# Patient Record
Sex: MALE | Race: BLACK OR AFRICAN AMERICAN | NOT HISPANIC OR LATINO | Employment: FULL TIME | URBAN - METROPOLITAN AREA
[De-identification: names, ages, dates, MRNs, and addresses within clinical notes are randomized per-mention and may not be internally consistent; named-entity substitution may affect disease eponyms.]

---

## 2023-11-30 ENCOUNTER — OFFICE VISIT (OUTPATIENT)
Dept: PRIMARY CARE | Facility: CLINIC | Age: 48
End: 2023-11-30
Payer: COMMERCIAL

## 2023-11-30 VITALS
RESPIRATION RATE: 18 BRPM | DIASTOLIC BLOOD PRESSURE: 97 MMHG | HEIGHT: 72 IN | WEIGHT: 204 LBS | BODY MASS INDEX: 27.63 KG/M2 | OXYGEN SATURATION: 98 % | SYSTOLIC BLOOD PRESSURE: 151 MMHG | HEART RATE: 91 BPM | TEMPERATURE: 97.9 F

## 2023-11-30 DIAGNOSIS — Z11.4 SCREENING FOR HIV (HUMAN IMMUNODEFICIENCY VIRUS): ICD-10-CM

## 2023-11-30 DIAGNOSIS — I10 PRIMARY HYPERTENSION: ICD-10-CM

## 2023-11-30 DIAGNOSIS — Z23 NEED FOR VACCINATION: ICD-10-CM

## 2023-11-30 DIAGNOSIS — Z00.00 HEALTHCARE MAINTENANCE: ICD-10-CM

## 2023-11-30 DIAGNOSIS — E55.9 HYPOVITAMINOSIS D: ICD-10-CM

## 2023-11-30 DIAGNOSIS — I10 HYPERTENSION, UNSPECIFIED TYPE: Primary | ICD-10-CM

## 2023-11-30 DIAGNOSIS — G89.29 CHRONIC BILATERAL LOW BACK PAIN WITHOUT SCIATICA: ICD-10-CM

## 2023-11-30 DIAGNOSIS — Z11.59 NEED FOR HEPATITIS C SCREENING TEST: ICD-10-CM

## 2023-11-30 DIAGNOSIS — D51.9 ANEMIA DUE TO VITAMIN B12 DEFICIENCY, UNSPECIFIED B12 DEFICIENCY TYPE: ICD-10-CM

## 2023-11-30 DIAGNOSIS — M54.50 CHRONIC BILATERAL LOW BACK PAIN WITHOUT SCIATICA: ICD-10-CM

## 2023-11-30 LAB
25(OH)D3 SERPL-MCNC: 37 NG/ML (ref 30–100)
ALBUMIN SERPL BCP-MCNC: 4.1 G/DL (ref 3.4–5)
ALP SERPL-CCNC: 97 U/L (ref 33–120)
ALT SERPL W P-5'-P-CCNC: 12 U/L (ref 10–52)
ANION GAP SERPL CALC-SCNC: 15 MMOL/L (ref 10–20)
AST SERPL W P-5'-P-CCNC: 19 U/L (ref 9–39)
BILIRUB SERPL-MCNC: 0.4 MG/DL (ref 0–1.2)
BUN SERPL-MCNC: 16 MG/DL (ref 6–23)
CALCIUM SERPL-MCNC: 9.7 MG/DL (ref 8.6–10.6)
CHLORIDE SERPL-SCNC: 103 MMOL/L (ref 98–107)
CHOLEST SERPL-MCNC: 161 MG/DL (ref 0–199)
CHOLESTEROL/HDL RATIO: 2.6
CO2 SERPL-SCNC: 28 MMOL/L (ref 21–32)
CREAT SERPL-MCNC: 0.97 MG/DL (ref 0.5–1.3)
GFR SERPL CREATININE-BSD FRML MDRD: >90 ML/MIN/1.73M*2
GLUCOSE SERPL-MCNC: 76 MG/DL (ref 74–99)
HCV AB SER QL: NONREACTIVE
HDLC SERPL-MCNC: 62.1 MG/DL
HIV 1+2 AB+HIV1 P24 AG SERPL QL IA: NONREACTIVE
NON-HDL CHOLESTEROL: 99 MG/DL (ref 0–149)
POTASSIUM SERPL-SCNC: 4.2 MMOL/L (ref 3.5–5.3)
PROT SERPL-MCNC: 7.6 G/DL (ref 6.4–8.2)
SODIUM SERPL-SCNC: 142 MMOL/L (ref 136–145)

## 2023-11-30 PROCEDURE — 86803 HEPATITIS C AB TEST: CPT

## 2023-11-30 PROCEDURE — 99396 PREV VISIT EST AGE 40-64: CPT | Performed by: INTERNAL MEDICINE

## 2023-11-30 PROCEDURE — 80053 COMPREHEN METABOLIC PANEL: CPT

## 2023-11-30 PROCEDURE — 3080F DIAST BP >= 90 MM HG: CPT | Performed by: INTERNAL MEDICINE

## 2023-11-30 PROCEDURE — 3077F SYST BP >= 140 MM HG: CPT | Performed by: INTERNAL MEDICINE

## 2023-11-30 PROCEDURE — 99212 OFFICE O/P EST SF 10 MIN: CPT | Performed by: INTERNAL MEDICINE

## 2023-11-30 PROCEDURE — 83718 ASSAY OF LIPOPROTEIN: CPT

## 2023-11-30 PROCEDURE — 87389 HIV-1 AG W/HIV-1&-2 AB AG IA: CPT

## 2023-11-30 PROCEDURE — 36415 COLL VENOUS BLD VENIPUNCTURE: CPT

## 2023-11-30 PROCEDURE — 82306 VITAMIN D 25 HYDROXY: CPT

## 2023-11-30 PROCEDURE — 1036F TOBACCO NON-USER: CPT | Performed by: INTERNAL MEDICINE

## 2023-11-30 PROCEDURE — 82465 ASSAY BLD/SERUM CHOLESTEROL: CPT

## 2023-11-30 PROCEDURE — 83036 HEMOGLOBIN GLYCOSYLATED A1C: CPT

## 2023-11-30 RX ORDER — AMLODIPINE BESYLATE 5 MG/1
5 TABLET ORAL DAILY
COMMUNITY
End: 2023-11-30 | Stop reason: SDUPTHER

## 2023-11-30 RX ORDER — OMEPRAZOLE 20 MG/1
20 CAPSULE, DELAYED RELEASE ORAL 2 TIMES DAILY
COMMUNITY
Start: 2019-12-30

## 2023-11-30 RX ORDER — LISINOPRIL 10 MG/1
10 TABLET ORAL DAILY
COMMUNITY
End: 2023-11-30 | Stop reason: WASHOUT

## 2023-11-30 RX ORDER — AMLODIPINE BESYLATE 5 MG/1
5 TABLET ORAL DAILY
Qty: 90 TABLET | Refills: 3 | Status: SHIPPED | OUTPATIENT
Start: 2023-11-30 | End: 2024-11-29

## 2023-11-30 RX ORDER — HYDROCHLOROTHIAZIDE 12.5 MG/1
12.5 TABLET ORAL DAILY
Qty: 90 TABLET | Refills: 3 | Status: SHIPPED | OUTPATIENT
Start: 2023-11-30 | End: 2024-11-29

## 2023-11-30 SDOH — HEALTH STABILITY: PHYSICAL HEALTH: ON AVERAGE, HOW MANY MINUTES DO YOU ENGAGE IN EXERCISE AT THIS LEVEL?: 60 MIN

## 2023-11-30 SDOH — ECONOMIC STABILITY: HOUSING INSECURITY
IN THE LAST 12 MONTHS, WAS THERE A TIME WHEN YOU DID NOT HAVE A STEADY PLACE TO SLEEP OR SLEPT IN A SHELTER (INCLUDING NOW)?: NO

## 2023-11-30 SDOH — ECONOMIC STABILITY: GENERAL
WHICH OF THE FOLLOWING DO YOU KNOW HOW TO USE AND HAVE ACCESS TO EVERY DAY? (CHOOSE ALL THAT APPLY): DESKTOP COMPUTER, LAPTOP COMPUTER, OR TABLET WITH BROADBAND INTERNET CONNECTION;SMARTPHONE WITH CELLULAR DATA PLAN

## 2023-11-30 SDOH — ECONOMIC STABILITY: FOOD INSECURITY: WITHIN THE PAST 12 MONTHS, YOU WORRIED THAT YOUR FOOD WOULD RUN OUT BEFORE YOU GOT MONEY TO BUY MORE.: NEVER TRUE

## 2023-11-30 SDOH — ECONOMIC STABILITY: INCOME INSECURITY: IN THE LAST 12 MONTHS, WAS THERE A TIME WHEN YOU WERE NOT ABLE TO PAY THE MORTGAGE OR RENT ON TIME?: NO

## 2023-11-30 SDOH — ECONOMIC STABILITY: FOOD INSECURITY: WITHIN THE PAST 12 MONTHS, THE FOOD YOU BOUGHT JUST DIDN'T LAST AND YOU DIDN'T HAVE MONEY TO GET MORE.: NEVER TRUE

## 2023-11-30 SDOH — ECONOMIC STABILITY: TRANSPORTATION INSECURITY
IN THE PAST 12 MONTHS, HAS LACK OF TRANSPORTATION KEPT YOU FROM MEETINGS, WORK, OR FROM GETTING THINGS NEEDED FOR DAILY LIVING?: NO

## 2023-11-30 SDOH — ECONOMIC STABILITY: TRANSPORTATION INSECURITY
IN THE PAST 12 MONTHS, HAS THE LACK OF TRANSPORTATION KEPT YOU FROM MEDICAL APPOINTMENTS OR FROM GETTING MEDICATIONS?: NO

## 2023-11-30 SDOH — HEALTH STABILITY: PHYSICAL HEALTH: ON AVERAGE, HOW MANY DAYS PER WEEK DO YOU ENGAGE IN MODERATE TO STRENUOUS EXERCISE (LIKE A BRISK WALK)?: 6 DAYS

## 2023-11-30 ASSESSMENT — ENCOUNTER SYMPTOMS
DIARRHEA: 0
PALPITATIONS: 0
LOSS OF SENSATION IN FEET: 0
CHEST TIGHTNESS: 0
AGITATION: 0
CHILLS: 0
OCCASIONAL FEELINGS OF UNSTEADINESS: 0
NAUSEA: 0
ACTIVITY CHANGE: 0
SHORTNESS OF BREATH: 0
MYALGIAS: 0
WEAKNESS: 0
DEPRESSION: 0
SORE THROAT: 0
BACK PAIN: 1
SINUS PRESSURE: 0

## 2023-11-30 ASSESSMENT — ANXIETY QUESTIONNAIRES
5. BEING SO RESTLESS THAT IT IS HARD TO SIT STILL: NOT AT ALL
3. WORRYING TOO MUCH ABOUT DIFFERENT THINGS: NOT AT ALL
GAD7 TOTAL SCORE: 0
2. NOT BEING ABLE TO STOP OR CONTROL WORRYING: NOT AT ALL
1. FEELING NERVOUS, ANXIOUS, OR ON EDGE: NOT AT ALL
7. FEELING AFRAID AS IF SOMETHING AWFUL MIGHT HAPPEN: NOT AT ALL
6. BECOMING EASILY ANNOYED OR IRRITABLE: NOT AT ALL
IF YOU CHECKED OFF ANY PROBLEMS ON THIS QUESTIONNAIRE, HOW DIFFICULT HAVE THESE PROBLEMS MADE IT FOR YOU TO DO YOUR WORK, TAKE CARE OF THINGS AT HOME, OR GET ALONG WITH OTHER PEOPLE: NOT DIFFICULT AT ALL
4. TROUBLE RELAXING: NOT AT ALL

## 2023-11-30 ASSESSMENT — COLUMBIA-SUICIDE SEVERITY RATING SCALE - C-SSRS
1. IN THE PAST MONTH, HAVE YOU WISHED YOU WERE DEAD OR WISHED YOU COULD GO TO SLEEP AND NOT WAKE UP?: NO
2. HAVE YOU ACTUALLY HAD ANY THOUGHTS OF KILLING YOURSELF?: NO
6. HAVE YOU EVER DONE ANYTHING, STARTED TO DO ANYTHING, OR PREPARED TO DO ANYTHING TO END YOUR LIFE?: NO

## 2023-11-30 ASSESSMENT — LIFESTYLE VARIABLES
AUDIT-C TOTAL SCORE: 2
HOW OFTEN DO YOU HAVE A DRINK CONTAINING ALCOHOL: MONTHLY OR LESS
SKIP TO QUESTIONS 9-10: 0
HOW OFTEN DO YOU HAVE SIX OR MORE DRINKS ON ONE OCCASION: LESS THAN MONTHLY
HOW MANY STANDARD DRINKS CONTAINING ALCOHOL DO YOU HAVE ON A TYPICAL DAY: 1 OR 2

## 2023-11-30 ASSESSMENT — SOCIAL DETERMINANTS OF HEALTH (SDOH)
HOW OFTEN DO YOU ATTENT MEETINGS OF THE CLUB OR ORGANIZATION YOU BELONG TO?: PATIENT DECLINED
WITHIN THE LAST YEAR, HAVE YOU BEEN AFRAID OF YOUR PARTNER OR EX-PARTNER?: NO
HOW HARD IS IT FOR YOU TO PAY FOR THE VERY BASICS LIKE FOOD, HOUSING, MEDICAL CARE, AND HEATING?: NOT VERY HARD
WITHIN THE LAST YEAR, HAVE YOU BEEN KICKED, HIT, SLAPPED, OR OTHERWISE PHYSICALLY HURT BY YOUR PARTNER OR EX-PARTNER?: NO
HOW OFTEN DO YOU GET TOGETHER WITH FRIENDS OR RELATIVES?: MORE THAN THREE TIMES A WEEK
ARE YOU MARRIED, WIDOWED, DIVORCED, SEPARATED, NEVER MARRIED, OR LIVING WITH A PARTNER?: PATIENT DECLINED
IN THE PAST 12 MONTHS, HAS THE ELECTRIC, GAS, OIL, OR WATER COMPANY THREATENED TO SHUT OFF SERVICE IN YOUR HOME?: NO
HOW OFTEN DO YOU ATTEND CHURCH OR RELIGIOUS SERVICES?: MORE THAN 4 TIMES PER YEAR
WITHIN THE LAST YEAR, HAVE TO BEEN RAPED OR FORCED TO HAVE ANY KIND OF SEXUAL ACTIVITY BY YOUR PARTNER OR EX-PARTNER?: NO
DO YOU BELONG TO ANY CLUBS OR ORGANIZATIONS SUCH AS CHURCH GROUPS UNIONS, FRATERNAL OR ATHLETIC GROUPS, OR SCHOOL GROUPS?: PATIENT DECLINED
IN A TYPICAL WEEK, HOW MANY TIMES DO YOU TALK ON THE PHONE WITH FAMILY, FRIENDS, OR NEIGHBORS?: MORE THAN THREE TIMES A WEEK
WITHIN THE LAST YEAR, HAVE YOU BEEN HUMILIATED OR EMOTIONALLY ABUSED IN OTHER WAYS BY YOUR PARTNER OR EX-PARTNER?: NO

## 2023-11-30 ASSESSMENT — PAIN SCALES - GENERAL: PAINLEVEL: 7

## 2023-11-30 ASSESSMENT — PATIENT HEALTH QUESTIONNAIRE - PHQ9
1. LITTLE INTEREST OR PLEASURE IN DOING THINGS: NOT AT ALL
2. FEELING DOWN, DEPRESSED OR HOPELESS: NOT AT ALL
SUM OF ALL RESPONSES TO PHQ9 QUESTIONS 1 & 2: 0

## 2023-11-30 NOTE — LETTER
November 30, 2023     Patient: Tha Leon Jr.   YOB: 1975   Date of Visit: 11/30/2023       To Whom It May Concern:    Tha Leon was seen in my clinic on 11/30/2023 at 1:45 pm. Please excuse Tha for his absence from work from 11/30/23 to 12/5/23.    If you have any questions or concerns, please don't hesitate to call.         Sincerely,         Billy Quesada MD        CC: No Recipients

## 2023-11-30 NOTE — PROGRESS NOTES
Subjective   Patient ID: Tha Leon Jr. is a 48 y.o. male who presents for Annual Exam and Back Pain.  He has back pain. Only taking amlodipine.  He has done well up until recently when he was picking up items that were too heavy.  The pain does not radiate into his legs but it prevents him from bending effectively.    Back Pain  Pertinent negatives include no chest pain or weakness.        Review of Systems   Constitutional:  Negative for activity change and chills.   HENT:  Negative for congestion, sinus pressure and sore throat.    Respiratory:  Negative for chest tightness and shortness of breath.    Cardiovascular:  Negative for chest pain and palpitations.   Gastrointestinal:  Negative for diarrhea and nausea.   Musculoskeletal:  Positive for back pain. Negative for myalgias.   Neurological:  Negative for weakness.   Psychiatric/Behavioral:  Negative for agitation and behavioral problems.        Objective   BP (!) 151/97 (BP Location: Left arm, Patient Position: Sitting, BP Cuff Size: Large adult)   Pulse 91   Temp 36.6 °C (97.9 °F) (Temporal)   Resp 18   Ht 1.829 m (6')   Wt 92.5 kg (204 lb)   SpO2 98%   BMI 27.67 kg/m²     Physical Exam  Constitutional:       Appearance: Normal appearance.   HENT:      Head: Normocephalic and atraumatic.      Nose: Nose normal.      Mouth/Throat:      Mouth: Mucous membranes are moist.   Eyes:      Extraocular Movements: Extraocular movements intact.      Pupils: Pupils are equal, round, and reactive to light.   Cardiovascular:      Rate and Rhythm: Normal rate and regular rhythm.   Pulmonary:      Effort: No respiratory distress.      Breath sounds: No wheezing.   Abdominal:      General: Bowel sounds are normal.      Palpations: Abdomen is soft.   Musculoskeletal:      Thoracic back: Tenderness present. Decreased range of motion.      Lumbar back: Spasms present. Decreased range of motion.   Neurological:      General: No focal deficit present.  "        Assessment/Plan   Problem List Items Addressed This Visit             ICD-10-CM    Chronic bilateral low back pain without sciatica M54.50, G89.29    Hypertension - Primary I10    Relevant Medications    hydroCHLOROthiazide (HYDRODiuril) 12.5 mg tablet    amLODIPine (Norvasc) 5 mg tablet    Other Relevant Orders    Lipid Panel Non-Fasting    Comprehensive Metabolic Panel     Other Visit Diagnoses         Codes    Screening for HIV (human immunodeficiency virus)     Z11.4    Relevant Orders    HIV 1/2 Antigen/Antibody Screen with Reflex to Confirmation    Need for vaccination     Z23    Relevant Medications    tetanus-diphtheria toxoids-Td (Tenivac 2-2) injection    Need for hepatitis C screening test     Z11.59    Relevant Orders    Hepatitis C Antibody    Healthcare maintenance     Z00.00    Relevant Orders    XR DEXA bone density    Hemoglobin A1C    Hepatitis B surface antibody    Hypovitaminosis D     E55.9    Relevant Orders    Vitamin D 25-Hydroxy,Total (for eval of Vitamin D levels)    Anemia due to vitamin B12 deficiency, unspecified B12 deficiency type     D51.9    Relevant Orders    Vitamin B12        Patient will need to be taking the muscle relaxant with over-the-counter nonsteroidals.  He is to give his back rest for the next few days.  Otherwise we will get screening labs as suggested by \"care gaps.\"       "

## 2023-12-01 LAB
EST. AVERAGE GLUCOSE BLD GHB EST-MCNC: 111 MG/DL
HBA1C MFR BLD: 5.5 %

## 2024-02-08 ENCOUNTER — OFFICE VISIT (OUTPATIENT)
Dept: GASTROENTEROLOGY | Facility: HOSPITAL | Age: 49
End: 2024-02-08
Payer: COMMERCIAL

## 2024-02-08 VITALS
WEIGHT: 195 LBS | TEMPERATURE: 97.7 F | OXYGEN SATURATION: 98 % | SYSTOLIC BLOOD PRESSURE: 116 MMHG | BODY MASS INDEX: 24.25 KG/M2 | DIASTOLIC BLOOD PRESSURE: 82 MMHG | HEART RATE: 110 BPM | HEIGHT: 75 IN

## 2024-02-08 DIAGNOSIS — K29.70 HELICOBACTER PYLORI GASTRITIS: ICD-10-CM

## 2024-02-08 DIAGNOSIS — B96.81 HELICOBACTER PYLORI GASTRITIS: ICD-10-CM

## 2024-02-08 DIAGNOSIS — K51.219 ULCERATIVE PROCTITIS WITH COMPLICATION (MULTI): Primary | ICD-10-CM

## 2024-02-08 PROCEDURE — 99215 OFFICE O/P EST HI 40 MIN: CPT | Performed by: STUDENT IN AN ORGANIZED HEALTH CARE EDUCATION/TRAINING PROGRAM

## 2024-02-08 PROCEDURE — 1036F TOBACCO NON-USER: CPT | Performed by: STUDENT IN AN ORGANIZED HEALTH CARE EDUCATION/TRAINING PROGRAM

## 2024-02-08 PROCEDURE — 3079F DIAST BP 80-89 MM HG: CPT | Performed by: STUDENT IN AN ORGANIZED HEALTH CARE EDUCATION/TRAINING PROGRAM

## 2024-02-08 PROCEDURE — 3074F SYST BP LT 130 MM HG: CPT | Performed by: STUDENT IN AN ORGANIZED HEALTH CARE EDUCATION/TRAINING PROGRAM

## 2024-02-08 RX ORDER — MESALAMINE 1000 MG/1
1000 SUPPOSITORY RECTAL NIGHTLY
Qty: 30 SUPPOSITORY | Refills: 0 | Status: SHIPPED | OUTPATIENT
Start: 2024-02-08 | End: 2024-03-09

## 2024-02-08 SDOH — ECONOMIC STABILITY: FOOD INSECURITY: WITHIN THE PAST 12 MONTHS, YOU WORRIED THAT YOUR FOOD WOULD RUN OUT BEFORE YOU GOT MONEY TO BUY MORE.: NEVER TRUE

## 2024-02-08 SDOH — ECONOMIC STABILITY: FOOD INSECURITY: WITHIN THE PAST 12 MONTHS, THE FOOD YOU BOUGHT JUST DIDN'T LAST AND YOU DIDN'T HAVE MONEY TO GET MORE.: NEVER TRUE

## 2024-02-08 ASSESSMENT — LIFESTYLE VARIABLES
HOW MANY STANDARD DRINKS CONTAINING ALCOHOL DO YOU HAVE ON A TYPICAL DAY: 3 OR 4
HOW OFTEN DO YOU HAVE A DRINK CONTAINING ALCOHOL: MONTHLY OR LESS
HOW OFTEN DO YOU HAVE SIX OR MORE DRINKS ON ONE OCCASION: NEVER
SKIP TO QUESTIONS 9-10: 0
AUDIT-C TOTAL SCORE: 2

## 2024-02-08 ASSESSMENT — COLUMBIA-SUICIDE SEVERITY RATING SCALE - C-SSRS
6. HAVE YOU EVER DONE ANYTHING, STARTED TO DO ANYTHING, OR PREPARED TO DO ANYTHING TO END YOUR LIFE?: NO
1. IN THE PAST MONTH, HAVE YOU WISHED YOU WERE DEAD OR WISHED YOU COULD GO TO SLEEP AND NOT WAKE UP?: NO
2. HAVE YOU ACTUALLY HAD ANY THOUGHTS OF KILLING YOURSELF?: NO

## 2024-02-08 ASSESSMENT — PATIENT HEALTH QUESTIONNAIRE - PHQ9
2. FEELING DOWN, DEPRESSED OR HOPELESS: NOT AT ALL
1. LITTLE INTEREST OR PLEASURE IN DOING THINGS: NOT AT ALL
SUM OF ALL RESPONSES TO PHQ9 QUESTIONS 1 & 2: 0

## 2024-02-08 ASSESSMENT — PAIN SCALES - GENERAL: PAINLEVEL: 0-NO PAIN

## 2024-02-08 ASSESSMENT — ENCOUNTER SYMPTOMS
OCCASIONAL FEELINGS OF UNSTEADINESS: 0
DEPRESSION: 0
LOSS OF SENSATION IN FEET: 0

## 2024-02-08 NOTE — PROGRESS NOTES
REASON FOR VISIT:  fu UC, HP gastritis    HPI:  LUBA GODDARD is a 47 year old male with a pmhx of HTN, PUD 2019 (gastric ulcer) attributed to NSAID use c/b GIB requiring transfusion, HP gastritis 2022, ulcerative proctitis who presents for fu.      Summary:   Admission 12/2019 (pulled from [facility]) for anemia and melena with drop in hgb from 12?7.8. Notes reports that EGD showed multiple gastric ulcers, non bleeding, biopsy taken, +HP. Unclear if treated or eradication confirmed. D/c on Prilosec 20 mg po bid for 2 months.  Initially seen for OV 10/2022 for fatigue and hematochezia. Labs on 10/11/22 with a hgb of 11.5, normocytic.   Planned for EGD/colonoscopy and repeat bloodwork in 3 months after iron supplementation.    Underwent EGD/colonoscopy 11/2022 with findings c/w HP gastritis and distal colitis. Started quad therapy with confirmatory breath testing in 2-3 months. Also with chronic colitis in rectum, started on topical mesalamine therapy.    OV 1/2023: Largely resolved UGI sx after initiation of HP therapy. Confirmatory breath testing in 2 months. Colonoscopy notable for ulcerative proctitis, likely etiology of prior hematochezia, intermittent compliance with topical therapy. Prefers suppositories to enemas, encouraged daily compliance. Plan for flex sig for disease assessment in 6-12 months. Bloodwork ordered (FCP, CRP, iron studies, cbc, cmp).     In the interim, breath testing, bloodwork, and flex sig not done.     Today, he reports he only took 3 days of topical therapy. He then stopped and remained asymptomatic for nearly a year. Gradual recurrence of sx after the new year. Had three weeks of bloody loose stools in January which resolved. Currently, BMs occur 2-3x/day, paste-y, occ blood 1-2x/week, light. No abdominal pain. +bloating. No n/v. + weight loss of 10 lbs which he attributes to not eating as well. No eye pain. +joint pain.     HCV NR 11/2023. CMP unremarkable 11/2023.     No fhx of CRC,  pancreatic, gastric or esophageal cancer. No tobacco use, occ alcohol use, and occ marijuana use.      Prev endoscopic eval:   EGD 11/30/2022: - Normal esophagus. - Z-line regular, 40 cm from the incisors. - Erythematous mucosa in the gastric body. Biopsied. - Erythematous congested duodenopathy. - The examination was otherwise normal.      Colonoscopy 11/30/2022: - Preparation of the colon was fair. - Aphtha in the terminal ileum. Biopsied. - Localized moderate inflammation was found in the mid rectum and in the distal rectum, rule out ulcerative colitis. Biopsied. - The examination was otherwise normal on direct and retroflexion views. - Biopsies for surveillance were taken from the cecum, ascending colon, transverse colon, descending colon and sigmoid colon.      Pathology:   A. GASTRIC ANTRUM AND BODY, BIOPSIES: --ACTIVE HELICOBACTER GASTRITIS. Note: Immunohistochemical staining highlights numerous microorganisms morphologically consistent with Helicobacter. The H. pylori drug susceptibility assay has been ordered and the results will appear separately in an addendum report.   B. TERMINAL ILEUM, BIOPSY: --FOCAL ACTIVE ILEITIS, NO GRANULOMAS IDENTIFIED.   C. CECUM AND ASCENDING COLON, BIOPSIES: --COLONIC MUCOSA, NO SIGNIFICANT HISTOPATHOLOGICAL ABNORMALITIES.   D. TRANSVERSE COLON, COLD BIOPSY: --COLONIC MUCOSA, NO SIGNIFICANT HISTOPATHOLOGICAL ABNORMALITIES.   E. DESCENDING AND SIGMOID COLON, BIOPSIES: --COLONIC MUCOSA, NO SIGNIFICANT HISTOPATHOLOGICAL ABNORMALITIES.   F. RECTUM, COLD BIOPSY: --DIFFUSE ACTIVE CHRONIC COLITIS, NO GRANULOMAS IDENTIFIED. Note: Histopathological features suggest ulcerative proctitis. However, infection associated proctitis and/or medication effects may cause similar histopathological alterations.      EGD 12/2019 for anemia/melena: report not found, prog note from hospitalization notes 3 antral ulcers  Pathology 12/2019:   Stomach, antrum, biopsy - Chronic active gastritis with H.  "pylori-like organisms; see comment.  COMMENT: H. pylori-like organisms are identified on routine H&E stained sections.     Colonoscopy: ?possible (wife denies, pt recalls taking a bowel prep in the remote past)     REVIEW OF SYSTEMS  CONSTITUTIONAL: Negative for fevers  HEENT: Negative for frequent/significant headaches  RESPIRATORY: Negative for hemoptysis  CARDIOVASCULAR: Negative for chest pain  GI: See HPI  : Negative for hematuria  MUSCULOSKELETAL: Negative for arthralgia or myalgia  INTEGUMENTARY/SKIN: Negative for rash or skin lesion  HEMATOLOGY/LYMPHOLOGY: Negative for prolonged bleeding  ENDOCRINE: Negative for cold/heat intolerance  NEURO: Negative for encephalopathy  PSYCH: Negative for new changes in mood or affect      No Known Allergies    Past Medical History:   Diagnosis Date    Personal history of other diseases of the circulatory system     History of hypertension    Personal history of other diseases of the musculoskeletal system and connective tissue     History of low back pain       No past surgical history on file.    No family history on file.    Social History     Tobacco Use    Smoking status: Never    Smokeless tobacco: Never   Substance Use Topics    Alcohol use: Yes     Alcohol/week: 4.0 standard drinks of alcohol     Types: 4 Shots of liquor per week       Current Outpatient Medications   Medication Sig Dispense Refill    amLODIPine (Norvasc) 5 mg tablet Take 1 tablet (5 mg) by mouth once daily. for blood pressure 90 tablet 3    hydroCHLOROthiazide (HYDRODiuril) 12.5 mg tablet Take 1 tablet (12.5 mg) by mouth once daily. 90 tablet 3    omeprazole (PriLOSEC) 20 mg DR capsule Take 1 capsule (20 mg) by mouth twice a day.       No current facility-administered medications for this visit.       PHYSICAL EXAM:  /82   Pulse 110   Temp 36.5 °C (97.7 °F)   Ht 1.905 m (6' 3\")   Wt 88.5 kg (195 lb)   SpO2 98%   BMI 24.37 kg/m²    Gen: aaox3, NAD  Head: Normocephalic, " atraumatic  Eyes: Sclera anicteric, conjunctiva pink   Neck: Supple  Heart: RRR, no murmurs, rubs or gallops  Lungs: CTAB, non-labored breathing  Abd: Soft, non-distended, non-tender, bowel sounds present, no rebound or guarding, no organomegaly  Ext: No LE edema b/l  Neuro: no gross focal deficits  Psych: Congruent mood and affect, appropriate insight and judgement  Skin: No jaundice    Lab Results   Component Value Date    ALT 12 11/30/2023    AST 19 11/30/2023    ALKPHOS 97 11/30/2023    BILITOT 0.4 11/30/2023       ASSESSMENT  HP gastritis, s/p therapy, eradication not confirmed  Ulcerative proctitis, not on therapy  Anemia, normocytic      Lost to follow up x 1 year. Remained asx for nearly that whole time, now with recurrence of sx. Will restart therapy. Plan for flex sig for disease assessment in 6-12 months. Bloodwork ordered. Confirmatory breath testing to confirm HP eradication also ordered.      PLAN       --HP breath test  --restart canasa supp qhs  --CRP, FCP for baseline   --flex sig in 6 months for disease assessment  --iron studies, CBC  --colon 2027 (5 year recall due to fair bowel prep) for screening     FU 4 months       Signature: Cheryl Abdullahi MD    Date: 2/8/2024  Time: 9:39 AM

## 2024-02-08 NOTE — PATIENT INSTRUCTIONS
Thank you for seeing us in clinic today!     In summary, please do the following:  We will schedule you for your flexible sigmoidoscopy in about  6 months.   2.   Please get your bloodwork, breath test, and stool studies at your earliest convenience.   3. Please start your Canasa suppositories every night for at least 3 weeks.     We will see you again in 4 months or sooner if needed.   If you have any questions or concerns, please call the office at [phone].

## 2024-02-19 ENCOUNTER — APPOINTMENT (OUTPATIENT)
Dept: GASTROENTEROLOGY | Facility: CLINIC | Age: 49
End: 2024-02-19
Payer: COMMERCIAL

## 2024-02-20 ENCOUNTER — APPOINTMENT (OUTPATIENT)
Dept: RADIOLOGY | Facility: CLINIC | Age: 49
End: 2024-02-20
Payer: COMMERCIAL

## 2024-03-20 ENCOUNTER — APPOINTMENT (OUTPATIENT)
Dept: GASTROENTEROLOGY | Facility: HOSPITAL | Age: 49
End: 2024-03-20
Payer: COMMERCIAL

## 2024-03-27 ENCOUNTER — HOSPITAL ENCOUNTER (EMERGENCY)
Facility: HOSPITAL | Age: 49
Discharge: HOME | End: 2024-03-28
Attending: EMERGENCY MEDICINE
Payer: COMMERCIAL

## 2024-03-27 DIAGNOSIS — R59.0 INTRA-ABDOMINAL LYMPHADENOPATHY: Primary | ICD-10-CM

## 2024-03-27 PROCEDURE — 99285 EMERGENCY DEPT VISIT HI MDM: CPT

## 2024-03-27 ASSESSMENT — COLUMBIA-SUICIDE SEVERITY RATING SCALE - C-SSRS
2. HAVE YOU ACTUALLY HAD ANY THOUGHTS OF KILLING YOURSELF?: NO
6. HAVE YOU EVER DONE ANYTHING, STARTED TO DO ANYTHING, OR PREPARED TO DO ANYTHING TO END YOUR LIFE?: NO
1. IN THE PAST MONTH, HAVE YOU WISHED YOU WERE DEAD OR WISHED YOU COULD GO TO SLEEP AND NOT WAKE UP?: NO

## 2024-03-27 ASSESSMENT — PAIN SCALES - GENERAL: PAINLEVEL_OUTOF10: 7

## 2024-03-27 ASSESSMENT — PAIN - FUNCTIONAL ASSESSMENT: PAIN_FUNCTIONAL_ASSESSMENT: 0-10

## 2024-03-27 ASSESSMENT — PAIN DESCRIPTION - PAIN TYPE: TYPE: ACUTE PAIN

## 2024-03-28 ENCOUNTER — APPOINTMENT (OUTPATIENT)
Dept: CARDIOLOGY | Facility: HOSPITAL | Age: 49
End: 2024-03-28
Payer: COMMERCIAL

## 2024-03-28 ENCOUNTER — APPOINTMENT (OUTPATIENT)
Dept: RADIOLOGY | Facility: HOSPITAL | Age: 49
End: 2024-03-28
Payer: COMMERCIAL

## 2024-03-28 VITALS
DIASTOLIC BLOOD PRESSURE: 89 MMHG | OXYGEN SATURATION: 99 % | BODY MASS INDEX: 24.87 KG/M2 | WEIGHT: 200 LBS | TEMPERATURE: 99.1 F | HEIGHT: 75 IN | HEART RATE: 93 BPM | SYSTOLIC BLOOD PRESSURE: 130 MMHG | RESPIRATION RATE: 18 BRPM

## 2024-03-28 LAB
ALBUMIN SERPL BCP-MCNC: 3.7 G/DL (ref 3.4–5)
ALP SERPL-CCNC: 86 U/L (ref 33–120)
ALT SERPL W P-5'-P-CCNC: 7 U/L (ref 10–52)
ANION GAP SERPL CALC-SCNC: 14 MMOL/L (ref 10–20)
APPEARANCE UR: CLEAR
AST SERPL W P-5'-P-CCNC: 15 U/L (ref 9–39)
BASOPHILS # BLD AUTO: 0.04 X10*3/UL (ref 0–0.1)
BASOPHILS NFR BLD AUTO: 0.3 %
BILIRUB SERPL-MCNC: 0.5 MG/DL (ref 0–1.2)
BILIRUB UR STRIP.AUTO-MCNC: NEGATIVE MG/DL
BUN SERPL-MCNC: 14 MG/DL (ref 6–23)
CALCIUM SERPL-MCNC: 9.8 MG/DL (ref 8.6–10.3)
CHLORIDE SERPL-SCNC: 94 MMOL/L (ref 98–107)
CO2 SERPL-SCNC: 30 MMOL/L (ref 21–32)
COLOR UR: ABNORMAL
CREAT SERPL-MCNC: 0.86 MG/DL (ref 0.5–1.3)
EGFRCR SERPLBLD CKD-EPI 2021: >90 ML/MIN/1.73M*2
EOSINOPHIL # BLD AUTO: 0.48 X10*3/UL (ref 0–0.7)
EOSINOPHIL NFR BLD AUTO: 3.4 %
ERYTHROCYTE [DISTWIDTH] IN BLOOD BY AUTOMATED COUNT: 13.4 % (ref 11.5–14.5)
FLUAV RNA RESP QL NAA+PROBE: NOT DETECTED
FLUBV RNA RESP QL NAA+PROBE: NOT DETECTED
GLUCOSE SERPL-MCNC: 98 MG/DL (ref 74–99)
GLUCOSE UR STRIP.AUTO-MCNC: NORMAL MG/DL
HCT VFR BLD AUTO: 32.5 % (ref 41–52)
HGB BLD-MCNC: 11.1 G/DL (ref 13.5–17.5)
HOLD SPECIMEN: NORMAL
IMM GRANULOCYTES # BLD AUTO: 0.05 X10*3/UL (ref 0–0.7)
IMM GRANULOCYTES NFR BLD AUTO: 0.4 % (ref 0–0.9)
KETONES UR STRIP.AUTO-MCNC: ABNORMAL MG/DL
LEUKOCYTE ESTERASE UR QL STRIP.AUTO: NEGATIVE
LIPASE SERPL-CCNC: 8 U/L (ref 9–82)
LYMPHOCYTES # BLD AUTO: 2.59 X10*3/UL (ref 1.2–4.8)
LYMPHOCYTES NFR BLD AUTO: 18.3 %
MCH RBC QN AUTO: 29.1 PG (ref 26–34)
MCHC RBC AUTO-ENTMCNC: 34.2 G/DL (ref 32–36)
MCV RBC AUTO: 85 FL (ref 80–100)
MONOCYTES # BLD AUTO: 1.16 X10*3/UL (ref 0.1–1)
MONOCYTES NFR BLD AUTO: 8.2 %
NEUTROPHILS # BLD AUTO: 9.8 X10*3/UL (ref 1.2–7.7)
NEUTROPHILS NFR BLD AUTO: 69.4 %
NITRITE UR QL STRIP.AUTO: NEGATIVE
NRBC BLD-RTO: 0 /100 WBCS (ref 0–0)
PH UR STRIP.AUTO: 5.5 [PH]
PLATELET # BLD AUTO: 400 X10*3/UL (ref 150–450)
POTASSIUM SERPL-SCNC: 3.9 MMOL/L (ref 3.5–5.3)
PROT SERPL-MCNC: 8.6 G/DL (ref 6.4–8.2)
PROT UR STRIP.AUTO-MCNC: NEGATIVE MG/DL
RBC # BLD AUTO: 3.81 X10*6/UL (ref 4.5–5.9)
RBC # UR STRIP.AUTO: NEGATIVE /UL
SARS-COV-2 RNA RESP QL NAA+PROBE: NOT DETECTED
SODIUM SERPL-SCNC: 134 MMOL/L (ref 136–145)
SP GR UR STRIP.AUTO: 1.04
UROBILINOGEN UR STRIP.AUTO-MCNC: NORMAL MG/DL
WBC # BLD AUTO: 14.1 X10*3/UL (ref 4.4–11.3)

## 2024-03-28 PROCEDURE — 83690 ASSAY OF LIPASE: CPT | Performed by: EMERGENCY MEDICINE

## 2024-03-28 PROCEDURE — 2500000005 HC RX 250 GENERAL PHARMACY W/O HCPCS: Performed by: EMERGENCY MEDICINE

## 2024-03-28 PROCEDURE — 85025 COMPLETE CBC W/AUTO DIFF WBC: CPT | Performed by: EMERGENCY MEDICINE

## 2024-03-28 PROCEDURE — 80053 COMPREHEN METABOLIC PANEL: CPT | Performed by: EMERGENCY MEDICINE

## 2024-03-28 PROCEDURE — 93005 ELECTROCARDIOGRAM TRACING: CPT

## 2024-03-28 PROCEDURE — 74177 CT ABD & PELVIS W/CONTRAST: CPT | Performed by: RADIOLOGY

## 2024-03-28 PROCEDURE — 81003 URINALYSIS AUTO W/O SCOPE: CPT | Performed by: EMERGENCY MEDICINE

## 2024-03-28 PROCEDURE — 87636 SARSCOV2 & INF A&B AMP PRB: CPT | Performed by: EMERGENCY MEDICINE

## 2024-03-28 PROCEDURE — 74177 CT ABD & PELVIS W/CONTRAST: CPT

## 2024-03-28 PROCEDURE — 2550000001 HC RX 255 CONTRASTS: Performed by: EMERGENCY MEDICINE

## 2024-03-28 PROCEDURE — 36415 COLL VENOUS BLD VENIPUNCTURE: CPT | Performed by: EMERGENCY MEDICINE

## 2024-03-28 RX ADMIN — IOHEXOL 75 ML: 350 INJECTION, SOLUTION INTRAVENOUS at 01:56

## 2024-03-28 RX ADMIN — Medication 10 ML: at 00:00

## 2024-03-28 NOTE — ED PROVIDER NOTES
HPI   Chief Complaint   Patient presents with    Abdominal Pain       HPI  Patient presents with 3 days of acute on chronic abdominal pain.  He is following up with a GI for sigmoidoscopy after colonoscopy.  He was told he had an area of irritation by his rectum which they want to reevaluate.  He denies any rectal pain today.  He states his pain is more epigastric.  He denies any laterality to the right or left.  Denies any new vomiting today or diarrhea.  He states he has been somewhat constipated with harder stools.  Denies any blood in his stool or pain with urination.  Denies any fever or cough.                  No data recorded                   Patient History   Past Medical History:   Diagnosis Date    Personal history of other diseases of the circulatory system     History of hypertension    Personal history of other diseases of the musculoskeletal system and connective tissue     History of low back pain     History reviewed. No pertinent surgical history.  No family history on file.  Social History     Tobacco Use    Smoking status: Never     Passive exposure: Never    Smokeless tobacco: Never   Vaping Use    Vaping Use: Never used   Substance Use Topics    Alcohol use: Yes     Alcohol/week: 4.0 standard drinks of alcohol     Types: 4 Shots of liquor per week     Comment: sparingly    Drug use: Yes     Types: Marijuana       Physical Exam   ED Triage Vitals [03/27/24 2304]   Temperature Heart Rate Respirations BP   37.3 °C (99.1 °F) 95 18 124/80      Pulse Ox Temp Source Heart Rate Source Patient Position   98 % Temporal Monitor Sitting      BP Location FiO2 (%)     Left arm --       Physical Exam  Vitals and nursing note reviewed.   Constitutional:       General: He is not in acute distress.     Appearance: He is well-developed.   HENT:      Head: Normocephalic and atraumatic.      Nose: No rhinorrhea.      Mouth/Throat:      Mouth: Mucous membranes are moist.   Eyes:      Conjunctiva/sclera: Conjunctivae  normal.   Cardiovascular:      Rate and Rhythm: Normal rate and regular rhythm.      Heart sounds: No murmur heard.  Pulmonary:      Effort: Pulmonary effort is normal. No respiratory distress.      Breath sounds: Normal breath sounds.   Abdominal:      Palpations: Abdomen is soft.      Tenderness: There is abdominal tenderness in the epigastric area.   Musculoskeletal:         General: No swelling.      Cervical back: Neck supple.   Skin:     General: Skin is warm and dry.      Capillary Refill: Capillary refill takes less than 2 seconds.   Neurological:      General: No focal deficit present.      Mental Status: He is alert.   Psychiatric:         Mood and Affect: Mood normal.         ED Course & MDM   Diagnoses as of 03/28/24 3056   Intra-abdominal lymphadenopathy       Medical Decision Making  Patient epigastric pain without guarding or rebound.  He did have a slight elevated white count and given his pain today he had I did obtain a CT scan which shows nonspecific lymphadenopathy.  They did consider the possibility of malignancy but there is no identified primary mass.  Patient is going to get the sigmoidoscopy and followed up with his GI doctor.  Additionally had some sclerosis on his back follow-up with his rheumatologist.  He does have psoriasis and this could be signs of psoriatic arthritis.    EKG interpreted by myself.  Normal sinus rhythm at a rate of 84 bpm.  Normal intervals.  Normal axis.  No signs of acute ischemia.      Procedure  Procedures     Wade Echols MD  03/28/24 6231

## 2024-03-29 LAB
ATRIAL RATE: 84 BPM
P AXIS: 71 DEGREES
P OFFSET: 206 MS
P ONSET: 148 MS
PR INTERVAL: 148 MS
Q ONSET: 222 MS
QRS COUNT: 14 BEATS
QRS DURATION: 86 MS
QT INTERVAL: 400 MS
QTC CALCULATION(BAZETT): 472 MS
QTC FREDERICIA: 447 MS
R AXIS: 67 DEGREES
T AXIS: 45 DEGREES
T OFFSET: 422 MS
VENTRICULAR RATE: 84 BPM

## 2024-04-01 DIAGNOSIS — R10.9 ABDOMINAL PAIN, UNSPECIFIED ABDOMINAL LOCATION: ICD-10-CM

## 2024-04-01 DIAGNOSIS — R19.7 DIARRHEA, UNSPECIFIED TYPE: Primary | ICD-10-CM

## 2024-04-01 NOTE — PROGRESS NOTES
Pt called with worsening abdominal pain and diarrhea. Increase ppi to bid without relief. Trip to ED notable for leukocytosis and LAD, infectious vs malignancy. Check stool tests, canasa daily, bloodwork. Flex sig cancelled, pt to reschedule.     Cheryl Abdullahi MD

## 2024-04-03 ENCOUNTER — HOSPITAL ENCOUNTER (OUTPATIENT)
Facility: HOSPITAL | Age: 49
Setting detail: OBSERVATION
Discharge: HOME | End: 2024-04-04
Attending: INTERNAL MEDICINE | Admitting: INTERNAL MEDICINE
Payer: COMMERCIAL

## 2024-04-03 ENCOUNTER — APPOINTMENT (OUTPATIENT)
Dept: RADIOLOGY | Facility: HOSPITAL | Age: 49
End: 2024-04-03
Payer: COMMERCIAL

## 2024-04-03 ENCOUNTER — APPOINTMENT (OUTPATIENT)
Dept: CARDIOLOGY | Facility: HOSPITAL | Age: 49
End: 2024-04-03
Payer: COMMERCIAL

## 2024-04-03 DIAGNOSIS — K27.9 PEPTIC ULCER DISEASE: ICD-10-CM

## 2024-04-03 DIAGNOSIS — L40.50 PSORIATIC ARTHRITIS (MULTI): ICD-10-CM

## 2024-04-03 DIAGNOSIS — K62.89 PROCTITIS: Primary | ICD-10-CM

## 2024-04-03 DIAGNOSIS — R93.3 ABNORMAL CT SCAN, COLON: ICD-10-CM

## 2024-04-03 LAB
ALBUMIN SERPL BCP-MCNC: 4.2 G/DL (ref 3.4–5)
ALP SERPL-CCNC: 102 U/L (ref 33–120)
ALT SERPL W P-5'-P-CCNC: 9 U/L (ref 10–52)
ANION GAP SERPL CALC-SCNC: 16 MMOL/L (ref 10–20)
APPEARANCE UR: CLEAR
AST SERPL W P-5'-P-CCNC: 14 U/L (ref 9–39)
BASOPHILS # BLD AUTO: 0.03 X10*3/UL (ref 0–0.1)
BASOPHILS NFR BLD AUTO: 0.2 %
BILIRUB SERPL-MCNC: 0.4 MG/DL (ref 0–1.2)
BILIRUB UR STRIP.AUTO-MCNC: NEGATIVE MG/DL
BUN SERPL-MCNC: 20 MG/DL (ref 6–23)
CALCIUM SERPL-MCNC: 10.6 MG/DL (ref 8.6–10.3)
CARDIAC TROPONIN I PNL SERPL HS: 5 NG/L (ref 0–20)
CHLORIDE SERPL-SCNC: 91 MMOL/L (ref 98–107)
CO2 SERPL-SCNC: 31 MMOL/L (ref 21–32)
COLOR UR: ABNORMAL
CREAT SERPL-MCNC: 1.25 MG/DL (ref 0.5–1.3)
CRP SERPL-MCNC: 17.28 MG/DL
EGFRCR SERPLBLD CKD-EPI 2021: 70 ML/MIN/1.73M*2
EOSINOPHIL # BLD AUTO: 0.33 X10*3/UL (ref 0–0.7)
EOSINOPHIL NFR BLD AUTO: 2.4 %
ERYTHROCYTE [DISTWIDTH] IN BLOOD BY AUTOMATED COUNT: 12.9 % (ref 11.5–14.5)
ERYTHROCYTE [SEDIMENTATION RATE] IN BLOOD BY WESTERGREN METHOD: 110 MM/H (ref 0–15)
FLUAV RNA RESP QL NAA+PROBE: NOT DETECTED
FLUBV RNA RESP QL NAA+PROBE: NOT DETECTED
GLUCOSE SERPL-MCNC: 120 MG/DL (ref 74–99)
GLUCOSE UR STRIP.AUTO-MCNC: NORMAL MG/DL
HCT VFR BLD AUTO: 39 % (ref 41–52)
HGB BLD-MCNC: 13.6 G/DL (ref 13.5–17.5)
IMM GRANULOCYTES # BLD AUTO: 0.05 X10*3/UL (ref 0–0.7)
IMM GRANULOCYTES NFR BLD AUTO: 0.4 % (ref 0–0.9)
KETONES UR STRIP.AUTO-MCNC: ABNORMAL MG/DL
LACTATE SERPL-SCNC: 1.2 MMOL/L (ref 0.4–2)
LEUKOCYTE ESTERASE UR QL STRIP.AUTO: NEGATIVE
LIPASE SERPL-CCNC: 9 U/L (ref 9–82)
LYMPHOCYTES # BLD AUTO: 2.87 X10*3/UL (ref 1.2–4.8)
LYMPHOCYTES NFR BLD AUTO: 20.5 %
MCH RBC QN AUTO: 29.1 PG (ref 26–34)
MCHC RBC AUTO-ENTMCNC: 34.9 G/DL (ref 32–36)
MCV RBC AUTO: 83 FL (ref 80–100)
MONOCYTES # BLD AUTO: 1.32 X10*3/UL (ref 0.1–1)
MONOCYTES NFR BLD AUTO: 9.4 %
NEUTROPHILS # BLD AUTO: 9.41 X10*3/UL (ref 1.2–7.7)
NEUTROPHILS NFR BLD AUTO: 67.1 %
NITRITE UR QL STRIP.AUTO: NEGATIVE
NRBC BLD-RTO: 0 /100 WBCS (ref 0–0)
PH UR STRIP.AUTO: 5.5 [PH]
PLATELET # BLD AUTO: 513 X10*3/UL (ref 150–450)
POTASSIUM SERPL-SCNC: 4.1 MMOL/L (ref 3.5–5.3)
PROT SERPL-MCNC: 10.1 G/DL (ref 6.4–8.2)
PROT UR STRIP.AUTO-MCNC: NEGATIVE MG/DL
RBC # BLD AUTO: 4.68 X10*6/UL (ref 4.5–5.9)
RBC # UR STRIP.AUTO: NEGATIVE /UL
RSV RNA RESP QL NAA+PROBE: NOT DETECTED
SARS-COV-2 RNA RESP QL NAA+PROBE: NOT DETECTED
SODIUM SERPL-SCNC: 134 MMOL/L (ref 136–145)
SP GR UR STRIP.AUTO: 1.02
UROBILINOGEN UR STRIP.AUTO-MCNC: NORMAL MG/DL
WBC # BLD AUTO: 14 X10*3/UL (ref 4.4–11.3)

## 2024-04-03 PROCEDURE — 2500000004 HC RX 250 GENERAL PHARMACY W/ HCPCS (ALT 636 FOR OP/ED): Performed by: NURSE PRACTITIONER

## 2024-04-03 PROCEDURE — 74177 CT ABD & PELVIS W/CONTRAST: CPT | Mod: FOREIGN READ | Performed by: RADIOLOGY

## 2024-04-03 PROCEDURE — G0378 HOSPITAL OBSERVATION PER HR: HCPCS

## 2024-04-03 PROCEDURE — 83605 ASSAY OF LACTIC ACID: CPT | Performed by: INTERNAL MEDICINE

## 2024-04-03 PROCEDURE — 99285 EMERGENCY DEPT VISIT HI MDM: CPT | Mod: 25

## 2024-04-03 PROCEDURE — 93005 ELECTROCARDIOGRAM TRACING: CPT

## 2024-04-03 PROCEDURE — 74177 CT ABD & PELVIS W/CONTRAST: CPT

## 2024-04-03 PROCEDURE — 86140 C-REACTIVE PROTEIN: CPT | Performed by: INTERNAL MEDICINE

## 2024-04-03 PROCEDURE — 71046 X-RAY EXAM CHEST 2 VIEWS: CPT

## 2024-04-03 PROCEDURE — 96375 TX/PRO/DX INJ NEW DRUG ADDON: CPT | Performed by: INTERNAL MEDICINE

## 2024-04-03 PROCEDURE — 84484 ASSAY OF TROPONIN QUANT: CPT | Performed by: INTERNAL MEDICINE

## 2024-04-03 PROCEDURE — 36415 COLL VENOUS BLD VENIPUNCTURE: CPT | Performed by: INTERNAL MEDICINE

## 2024-04-03 PROCEDURE — 80053 COMPREHEN METABOLIC PANEL: CPT | Performed by: INTERNAL MEDICINE

## 2024-04-03 PROCEDURE — 2550000001 HC RX 255 CONTRASTS: Performed by: INTERNAL MEDICINE

## 2024-04-03 PROCEDURE — 83690 ASSAY OF LIPASE: CPT | Performed by: INTERNAL MEDICINE

## 2024-04-03 PROCEDURE — 87637 SARSCOV2&INF A&B&RSV AMP PRB: CPT | Performed by: INTERNAL MEDICINE

## 2024-04-03 PROCEDURE — 96361 HYDRATE IV INFUSION ADD-ON: CPT | Performed by: INTERNAL MEDICINE

## 2024-04-03 PROCEDURE — 2500000005 HC RX 250 GENERAL PHARMACY W/O HCPCS: Performed by: INTERNAL MEDICINE

## 2024-04-03 PROCEDURE — 87040 BLOOD CULTURE FOR BACTERIA: CPT | Mod: AHULAB | Performed by: INTERNAL MEDICINE

## 2024-04-03 PROCEDURE — 85025 COMPLETE CBC W/AUTO DIFF WBC: CPT | Performed by: INTERNAL MEDICINE

## 2024-04-03 PROCEDURE — 81003 URINALYSIS AUTO W/O SCOPE: CPT | Performed by: INTERNAL MEDICINE

## 2024-04-03 PROCEDURE — 71046 X-RAY EXAM CHEST 2 VIEWS: CPT | Performed by: RADIOLOGY

## 2024-04-03 PROCEDURE — 2500000004 HC RX 250 GENERAL PHARMACY W/ HCPCS (ALT 636 FOR OP/ED): Performed by: INTERNAL MEDICINE

## 2024-04-03 PROCEDURE — 96365 THER/PROPH/DIAG IV INF INIT: CPT | Mod: 59 | Performed by: INTERNAL MEDICINE

## 2024-04-03 PROCEDURE — 85652 RBC SED RATE AUTOMATED: CPT | Performed by: INTERNAL MEDICINE

## 2024-04-03 RX ORDER — CHOLECALCIFEROL (VITAMIN D3) 25 MCG
1000 TABLET ORAL DAILY
COMMUNITY

## 2024-04-03 RX ORDER — HYDROCHLOROTHIAZIDE 12.5 MG/1
12.5 TABLET ORAL DAILY
Status: DISCONTINUED | OUTPATIENT
Start: 2024-04-04 | End: 2024-04-04 | Stop reason: HOSPADM

## 2024-04-03 RX ORDER — TALC
6 POWDER (GRAM) TOPICAL NIGHTLY
Status: DISCONTINUED | OUTPATIENT
Start: 2024-04-03 | End: 2024-04-04 | Stop reason: HOSPADM

## 2024-04-03 RX ORDER — PANTOPRAZOLE SODIUM 40 MG/10ML
40 INJECTION, POWDER, LYOPHILIZED, FOR SOLUTION INTRAVENOUS
Status: DISCONTINUED | OUTPATIENT
Start: 2024-04-04 | End: 2024-04-04 | Stop reason: HOSPADM

## 2024-04-03 RX ORDER — AMLODIPINE BESYLATE 5 MG/1
5 TABLET ORAL DAILY
Status: DISCONTINUED | OUTPATIENT
Start: 2024-04-04 | End: 2024-04-04 | Stop reason: HOSPADM

## 2024-04-03 RX ORDER — FAMOTIDINE 10 MG/ML
20 INJECTION INTRAVENOUS ONCE
Status: COMPLETED | OUTPATIENT
Start: 2024-04-03 | End: 2024-04-03

## 2024-04-03 RX ORDER — PANTOPRAZOLE SODIUM 40 MG/1
40 TABLET, DELAYED RELEASE ORAL
Status: DISCONTINUED | OUTPATIENT
Start: 2024-04-04 | End: 2024-04-04 | Stop reason: HOSPADM

## 2024-04-03 RX ORDER — TRAZODONE HYDROCHLORIDE 50 MG/1
100 TABLET ORAL NIGHTLY PRN
Status: DISCONTINUED | OUTPATIENT
Start: 2024-04-03 | End: 2024-04-04 | Stop reason: HOSPADM

## 2024-04-03 RX ORDER — FERROUS SULFATE 325(65) MG
65 TABLET, DELAYED RELEASE (ENTERIC COATED) ORAL
COMMUNITY

## 2024-04-03 RX ORDER — ACETAMINOPHEN 325 MG/1
650 TABLET ORAL ONCE
Status: COMPLETED | OUTPATIENT
Start: 2024-04-03 | End: 2024-04-03

## 2024-04-03 RX ORDER — SODIUM CHLORIDE, SODIUM LACTATE, POTASSIUM CHLORIDE, CALCIUM CHLORIDE 600; 310; 30; 20 MG/100ML; MG/100ML; MG/100ML; MG/100ML
75 INJECTION, SOLUTION INTRAVENOUS CONTINUOUS
Status: DISCONTINUED | OUTPATIENT
Start: 2024-04-03 | End: 2024-04-04 | Stop reason: HOSPADM

## 2024-04-03 RX ORDER — MESALAMINE 1000 MG/1
1000 SUPPOSITORY RECTAL NIGHTLY
Status: DISCONTINUED | OUTPATIENT
Start: 2024-04-03 | End: 2024-04-04 | Stop reason: HOSPADM

## 2024-04-03 RX ADMIN — PIPERACILLIN SODIUM AND TAZOBACTAM SODIUM 3.38 G: 3; .375 INJECTION, SOLUTION INTRAVENOUS at 22:40

## 2024-04-03 RX ADMIN — IOHEXOL 75 ML: 350 INJECTION, SOLUTION INTRAVENOUS at 15:09

## 2024-04-03 RX ADMIN — DIPHENHYDRAMINE HYDROCHLORIDE AND LIDOCAINE HYDROCHLORIDE AND ALUMINUM HYDROXIDE AND MAGNESIUM HYDRO 10 ML: KIT at 12:44

## 2024-04-03 RX ADMIN — FAMOTIDINE 20 MG: 10 INJECTION, SOLUTION INTRAVENOUS at 12:44

## 2024-04-03 RX ADMIN — SODIUM CHLORIDE, POTASSIUM CHLORIDE, SODIUM LACTATE AND CALCIUM CHLORIDE 75 ML/HR: 600; 310; 30; 20 INJECTION, SOLUTION INTRAVENOUS at 20:56

## 2024-04-03 RX ADMIN — PIPERACILLIN SODIUM AND TAZOBACTAM SODIUM 3.38 G: 3; .375 INJECTION, SOLUTION INTRAVENOUS at 17:16

## 2024-04-03 RX ADMIN — SODIUM CHLORIDE 1000 ML: 9 INJECTION, SOLUTION INTRAVENOUS at 12:44

## 2024-04-03 RX ADMIN — ACETAMINOPHEN 650 MG: 325 TABLET ORAL at 11:00

## 2024-04-03 SDOH — SOCIAL STABILITY: SOCIAL INSECURITY: ARE YOU OR HAVE YOU BEEN THREATENED OR ABUSED PHYSICALLY, EMOTIONALLY, OR SEXUALLY BY ANYONE?: NO

## 2024-04-03 SDOH — SOCIAL STABILITY: SOCIAL INSECURITY: HAVE YOU HAD THOUGHTS OF HARMING ANYONE ELSE?: NO

## 2024-04-03 SDOH — SOCIAL STABILITY: SOCIAL INSECURITY: ABUSE: ADULT

## 2024-04-03 SDOH — SOCIAL STABILITY: SOCIAL INSECURITY: HAS ANYONE EVER THREATENED TO HURT YOUR FAMILY OR YOUR PETS?: NO

## 2024-04-03 SDOH — SOCIAL STABILITY: SOCIAL INSECURITY: DO YOU FEEL UNSAFE GOING BACK TO THE PLACE WHERE YOU ARE LIVING?: NO

## 2024-04-03 SDOH — SOCIAL STABILITY: SOCIAL INSECURITY: ARE THERE ANY APPARENT SIGNS OF INJURIES/BEHAVIORS THAT COULD BE RELATED TO ABUSE/NEGLECT?: NO

## 2024-04-03 SDOH — SOCIAL STABILITY: SOCIAL INSECURITY: DOES ANYONE TRY TO KEEP YOU FROM HAVING/CONTACTING OTHER FRIENDS OR DOING THINGS OUTSIDE YOUR HOME?: NO

## 2024-04-03 SDOH — SOCIAL STABILITY: SOCIAL INSECURITY: DO YOU FEEL ANYONE HAS EXPLOITED OR TAKEN ADVANTAGE OF YOU FINANCIALLY OR OF YOUR PERSONAL PROPERTY?: NO

## 2024-04-03 ASSESSMENT — PATIENT HEALTH QUESTIONNAIRE - PHQ9
2. FEELING DOWN, DEPRESSED OR HOPELESS: NOT AT ALL
2. FEELING DOWN, DEPRESSED OR HOPELESS: NOT AT ALL
1. LITTLE INTEREST OR PLEASURE IN DOING THINGS: NOT AT ALL
1. LITTLE INTEREST OR PLEASURE IN DOING THINGS: NOT AT ALL
SUM OF ALL RESPONSES TO PHQ9 QUESTIONS 1 & 2: 0
SUM OF ALL RESPONSES TO PHQ9 QUESTIONS 1 & 2: 0

## 2024-04-03 ASSESSMENT — COGNITIVE AND FUNCTIONAL STATUS - GENERAL
PATIENT BASELINE BEDBOUND: NO
DAILY ACTIVITIY SCORE: 24
MOBILITY SCORE: 24

## 2024-04-03 ASSESSMENT — ENCOUNTER SYMPTOMS
APPETITE CHANGE: 1
HEMATOLOGIC/LYMPHATIC NEGATIVE: 1
MUSCULOSKELETAL NEGATIVE: 1
DIZZINESS: 1
ENDOCRINE NEGATIVE: 1
WEAKNESS: 1
RESPIRATORY NEGATIVE: 1
ABDOMINAL PAIN: 1
EYES NEGATIVE: 1
PSYCHIATRIC NEGATIVE: 1
ALLERGIC/IMMUNOLOGIC NEGATIVE: 1
CARDIOVASCULAR NEGATIVE: 1

## 2024-04-03 ASSESSMENT — PAIN - FUNCTIONAL ASSESSMENT
PAIN_FUNCTIONAL_ASSESSMENT: 0-10

## 2024-04-03 ASSESSMENT — LIFESTYLE VARIABLES
AUDIT-C TOTAL SCORE: 1
HOW MANY STANDARD DRINKS CONTAINING ALCOHOL DO YOU HAVE ON A TYPICAL DAY: 1 OR 2
HOW OFTEN DO YOU HAVE 6 OR MORE DRINKS ON ONE OCCASION: NEVER
AUDIT-C TOTAL SCORE: 1
HOW OFTEN DO YOU HAVE A DRINK CONTAINING ALCOHOL: MONTHLY OR LESS
SKIP TO QUESTIONS 9-10: 1

## 2024-04-03 ASSESSMENT — ACTIVITIES OF DAILY LIVING (ADL)
LACK_OF_TRANSPORTATION: NO
GROOMING: INDEPENDENT
FEEDING YOURSELF: INDEPENDENT
TOILETING: INDEPENDENT
JUDGMENT_ADEQUATE_SAFELY_COMPLETE_DAILY_ACTIVITIES: YES
PATIENT'S MEMORY ADEQUATE TO SAFELY COMPLETE DAILY ACTIVITIES?: YES
BATHING: INDEPENDENT
DRESSING YOURSELF: INDEPENDENT
HEARING - RIGHT EAR: FUNCTIONAL
WALKS IN HOME: INDEPENDENT
ADEQUATE_TO_COMPLETE_ADL: YES
HEARING - LEFT EAR: FUNCTIONAL

## 2024-04-03 ASSESSMENT — COLUMBIA-SUICIDE SEVERITY RATING SCALE - C-SSRS
2. HAVE YOU ACTUALLY HAD ANY THOUGHTS OF KILLING YOURSELF?: NO
1. IN THE PAST MONTH, HAVE YOU WISHED YOU WERE DEAD OR WISHED YOU COULD GO TO SLEEP AND NOT WAKE UP?: NO
6. HAVE YOU EVER DONE ANYTHING, STARTED TO DO ANYTHING, OR PREPARED TO DO ANYTHING TO END YOUR LIFE?: NO

## 2024-04-03 ASSESSMENT — PAIN SCALES - GENERAL
PAINLEVEL_OUTOF10: 0 - NO PAIN
PAINLEVEL_OUTOF10: 7
PAINLEVEL_OUTOF10: 10 - WORST POSSIBLE PAIN

## 2024-04-03 ASSESSMENT — PAIN DESCRIPTION - LOCATION: LOCATION: ABDOMEN

## 2024-04-03 NOTE — PROGRESS NOTES
Pharmacy Medication History Review    Tha Leon Jr. is a 48 y.o. male admitted for Proctitis. Pharmacy reviewed the patient's pwryr-ay-uaglykcin medications and allergies for accuracy.    The list below reflectives the updated PTA list. Please review each medication in order reconciliation for additional clarification and justification.  Prior to Admission medications    Medication Sig Start Date End Date Taking? Authorizing Provider                                                        The list below reflectives the updated allergy list. Please review each documented allergy for additional clarification and justification.  Allergies  Reviewed by Tha Grove RN on 4/3/2024   No Known Allergies         Below are additional concerns with the patient's PTA list.  Prior to Admission Medications   amLODIPine (Norvasc) 5 mg tablet   No   Sig: Take 1 tablet (5 mg) by mouth once daily. for blood pressure   cholecalciferol (Vitamin D3) 25 MCG (1000 UT) tablet   Yes   Sig: Take 1 tablet (1,000 Units) by mouth once daily.   ferrous sulfate 325 (65 Fe) MG EC tablet   Yes   Sig: Take 65 mg by mouth once daily with breakfast. Do not crush, chew, or split.   hydroCHLOROthiazide (HYDRODiuril) 12.5 mg tablet   No   Sig: Take 1 tablet (12.5 mg) by mouth once daily.   mesalamine (Canasa) 1,000 mg suppository   No   Sig: Insert 1 suppository (1,000 mg) into the rectum once daily at bedtime.   omeprazole (PriLOSEC) 20 mg DR capsule   Yes   Sig: Take 1 capsule (20 mg) by mouth twice a day.      Facility-Administered Medications: None    Per patient - also taking ferrous sulf., and vitamin D 1000 ut.  No changes    Arti Sampson

## 2024-04-03 NOTE — ED TRIAGE NOTES
PT TO ED FROM HOME WITH C/O MID UMBILICAL ABD PAIN FOR 2 WEEKS RECENTLY DX WITH H-PYLORI. PT DENIES N/V/D

## 2024-04-03 NOTE — H&P
History Of Present Illness  Tha Leon Jr. is a 48 y.o. male presenting with abdominal pain.    ED HPI:  CC: Abdominal Pain      HPI: Tha Leon Jr. is a 48 y.o. male with a history of HTN, anemia, ulcerative proctitis, peptic ulcer disease attributed to NSAID use and H. pylori, presents with epigastric pain.  Symptoms have been present over the past week.  He was seen 6 days ago in the ED, CT showing some lymphadenopathy.  He states he improved somewhat with a GI cocktail but symptoms recurred on returning home.  He endorses epigastric abdominal pain worse with eating with associated dysgeusia.  He states he has not eaten in 7 days.  He denies any nausea, only minimally nauseated.  Has not had a bowel movement in a few days.  He states symptoms have really been going on since January, just getting worse and worse.  He has been following up with GI, states they want to get a an H. pylori test and he has an upcoming scope planned  Tha Leon Jr. is a 48 y.o. male with a history of HTN, anemia, ulcerative proctitis, peptic ulcer disease attributed to NSAID use and H. pylori, presents with epigastric pain, anorexia, and induced to see a.  He is notably tachycardic on presentation, otherwise well-appearing with mild tenderness in the epigastrium.  CT was performed at most recent ED visit and showed nonspecific prominent right lower quadrant lymph nodes and pelvic lymph nodes possibly reactive in nature such as from inflammatory process of the bowel or malignancy.  Suspect recurrent PUD, other possibilities include gastroenteritis, other inflammatory process.  Of note his wife is here with similar symptoms and fever, patient is notably afebrile.  Workup was initiated with labs, urinalysis, and repeat CT given significant tachycardia and uncontrolled symptoms. See below for details of ED course and ultimate disposition.     On interview in the ED patient tells me that a week ago when he was here for the same  thing and took the GI cocktail he felt great then went home never felt better has not eaten anything for a week and returns today miserable with abdominal pain.  He had a BMX in the ED and feels better, he is being admitted for GI consult possible scopes and IV antibiotics.     Past Medical History  Past Medical History:   Diagnosis Date    Personal history of other diseases of the circulatory system     History of hypertension    Personal history of other diseases of the musculoskeletal system and connective tissue     History of low back pain       Surgical History       Social History  He reports that he has never smoked. He has never been exposed to tobacco smoke. He has never used smokeless tobacco. He reports current alcohol use of about 4.0 standard drinks of alcohol per week. He reports current drug use. Drug: Marijuana.  Works for the [employer]   Lives with his wife     Family History  Sister passed away  Mom  HX of HTN  Dad HX of HTN     Allergies  Patient has no known allergies.    Review of Systems   Constitutional:  Positive for appetite change.   HENT: Negative.     Eyes: Negative.    Respiratory: Negative.     Cardiovascular: Negative.    Gastrointestinal:  Positive for abdominal pain.   Endocrine: Negative.    Genitourinary: Negative.    Musculoskeletal: Negative.    Skin: Negative.    Allergic/Immunologic: Negative.    Neurological:  Positive for dizziness and weakness.   Hematological: Negative.    Psychiatric/Behavioral: Negative.          Physical Exam  Constitutional:       Appearance: Normal appearance. He is ill-appearing.   HENT:      Mouth/Throat:      Mouth: Mucous membranes are moist.   Cardiovascular:      Rate and Rhythm: Regular rhythm.   Abdominal:      Palpations: Abdomen is soft.   Musculoskeletal:         General: Normal range of motion.   Skin:     General: Skin is warm and dry.   Neurological:      General: No focal deficit present.      Mental Status: He is alert and oriented  "to person, place, and time.        Last Recorded Vitals  Blood pressure 111/77, pulse 99, temperature 37.1 °C (98.7 °F), resp. rate 16, height 1.905 m (6' 3\"), weight 86.2 kg (190 lb), SpO2 98 %.    Relevant Results  Scheduled medications  [START ON 4/4/2024] amLODIPine, 5 mg, oral, Daily  [START ON 4/4/2024] hydroCHLOROthiazide, 12.5 mg, oral, Daily  mesalamine, 1,000 mg, rectal, Nightly  [START ON 4/4/2024] pantoprazole, 40 mg, oral, BID AC   Or  [START ON 4/4/2024] pantoprazole, 40 mg, intravenous, BID AC      Continuous medications     PRN medications     Results for orders placed or performed during the hospital encounter of 04/03/24 (from the past 24 hour(s))   CBC and Auto Differential   Result Value Ref Range    WBC 14.0 (H) 4.4 - 11.3 x10*3/uL    nRBC 0.0 0.0 - 0.0 /100 WBCs    RBC 4.68 4.50 - 5.90 x10*6/uL    Hemoglobin 13.6 13.5 - 17.5 g/dL    Hematocrit 39.0 (L) 41.0 - 52.0 %    MCV 83 80 - 100 fL    MCH 29.1 26.0 - 34.0 pg    MCHC 34.9 32.0 - 36.0 g/dL    RDW 12.9 11.5 - 14.5 %    Platelets 513 (H) 150 - 450 x10*3/uL    Neutrophils % 67.1 40.0 - 80.0 %    Immature Granulocytes %, Automated 0.4 0.0 - 0.9 %    Lymphocytes % 20.5 13.0 - 44.0 %    Monocytes % 9.4 2.0 - 10.0 %    Eosinophils % 2.4 0.0 - 6.0 %    Basophils % 0.2 0.0 - 2.0 %    Neutrophils Absolute 9.41 (H) 1.20 - 7.70 x10*3/uL    Immature Granulocytes Absolute, Automated 0.05 0.00 - 0.70 x10*3/uL    Lymphocytes Absolute 2.87 1.20 - 4.80 x10*3/uL    Monocytes Absolute 1.32 (H) 0.10 - 1.00 x10*3/uL    Eosinophils Absolute 0.33 0.00 - 0.70 x10*3/uL    Basophils Absolute 0.03 0.00 - 0.10 x10*3/uL   Comprehensive Metabolic Panel   Result Value Ref Range    Glucose 120 (H) 74 - 99 mg/dL    Sodium 134 (L) 136 - 145 mmol/L    Potassium 4.1 3.5 - 5.3 mmol/L    Chloride 91 (L) 98 - 107 mmol/L    Bicarbonate 31 21 - 32 mmol/L    Anion Gap 16 10 - 20 mmol/L    Urea Nitrogen 20 6 - 23 mg/dL    Creatinine 1.25 0.50 - 1.30 mg/dL    eGFR 70 >60 " mL/min/1.73m*2    Calcium 10.6 (H) 8.6 - 10.3 mg/dL    Albumin 4.2 3.4 - 5.0 g/dL    Alkaline Phosphatase 102 33 - 120 U/L    Total Protein 10.1 (H) 6.4 - 8.2 g/dL    AST 14 9 - 39 U/L    Bilirubin, Total 0.4 0.0 - 1.2 mg/dL    ALT 9 (L) 10 - 52 U/L   Lactate   Result Value Ref Range    Lactate 1.2 0.4 - 2.0 mmol/L   Blood Culture    Specimen: Peripheral Venipuncture; Blood culture   Result Value Ref Range    Blood Culture Loaded on Instrument - Culture in progress    Blood Culture    Specimen: Peripheral Venipuncture; Blood culture   Result Value Ref Range    Blood Culture Loaded on Instrument - Culture in progress    Lipase   Result Value Ref Range    Lipase 9 9 - 82 U/L   Troponin I, High Sensitivity   Result Value Ref Range    Troponin I, High Sensitivity 5 0 - 20 ng/L   Sars-CoV-2 and Influenza A/B PCR   Result Value Ref Range    Flu A Result Not Detected Not Detected    Flu B Result Not Detected Not Detected    Coronavirus 2019, PCR Not Detected Not Detected   RSV PCR   Result Value Ref Range    RSV PCR Not Detected Not Detected   Sedimentation rate, automated   Result Value Ref Range    Sedimentation Rate 110 (H) 0 - 15 mm/h   C-reactive protein   Result Value Ref Range    C-Reactive Protein 17.28 (H) <1.00 mg/dL   Urinalysis with Reflex Culture and Microscopic   Result Value Ref Range    Color, Urine Light-Yellow Light-Yellow, Yellow, Dark-Yellow    Appearance, Urine Clear Clear    Specific Gravity, Urine 1.016 1.005 - 1.035    pH, Urine 5.5 5.0, 5.5, 6.0, 6.5, 7.0, 7.5, 8.0    Protein, Urine NEGATIVE NEGATIVE, 10 (TRACE), 20 (TRACE) mg/dL    Glucose, Urine Normal Normal mg/dL    Blood, Urine NEGATIVE NEGATIVE    Ketones, Urine TRACE (A) NEGATIVE mg/dL    Bilirubin, Urine NEGATIVE NEGATIVE    Urobilinogen, Urine Normal Normal mg/dL    Nitrite, Urine NEGATIVE NEGATIVE    Leukocyte Esterase, Urine NEGATIVE NEGATIVE      CT abdomen pelvis w IV contrast   Final Result   1. Mild segmental wall thickening of the  distal sigmoid colon and   rectum again noted with multiple prominent perirectal lymph nodes   measuring up to 1 cm. Findings may be secondary to a mild segmental   colitis/proctitis, although difficult to exclude underlying neoplastic   process. Recommend further evaluation with colonoscopy if not   previously performed.   2. Stable mildly prominent lymph nodes in the right lower quadrant   again noted measuring up to approximately 1.2 cm.   3. Mild hepatic steatosis.   4. Stable nonspecific sclerosis of L4 and L5 vertebral bodies and also   multilevel anterior sclerosis of T9 through L3 and anterior osseous   spurring. Osseous fusion of the bilateral sacroiliac joints with   sclerosis and lucency, findings concerning for underlying   spondyloarthropathy, and follow-up rheumatology   consultation/evaluation is recommended.         Signed by Frank Olivia MD      XR chest 2 views   Final Result   No evidence of acute intrathoracic abnormality.        Signed by: Chance Pillai 4/3/2024 11:51 AM   Dictation workstation:   [id number]            Assessment/Plan   Principal Problem:    Proctitis  Known to GI outpatient  GI consulted for possible scopes  Tolerating gingerale barely- npo after midnight for possible scopes   No PO able for 7 days  IV PPI  IVF  BMX in the ED helped pain - better    HX of HTN  Home norvasc & hydrochlorothiazide    DVT  Prophaysis ambulate       Giulia Mitchell, APRN-CNP

## 2024-04-03 NOTE — ED PROVIDER NOTES
HPI     CC: Abdominal Pain     HPI: Tha Leon Jr. is a 48 y.o. male with a history of HTN, anemia, ulcerative proctitis, peptic ulcer disease attributed to NSAID use and H. pylori, presents with epigastric pain.  Symptoms have been present over the past week.  He was seen 6 days ago in the ED, CT showing some lymphadenopathy.  He states he improved somewhat with a GI cocktail but symptoms recurred on returning home.  He endorses epigastric abdominal pain worse with eating with associated dysgeusia.  He states he has not eaten in 7 days.  He denies any nausea, only minimally nauseated.  Has not had a bowel movement in a few days.  He states symptoms have really been going on since January, just getting worse and worse.  He has been following up with GI, states they want to get a an H. pylori test and he has an upcoming scope planned.    ROS: 10-point review of systems was performed and is otherwise negative except as noted in HPI.    Limitations to history: N/A    Independent Historians: N/A    External Records Reviewed: Prior outpatient notes and discharge summary    Past Medical History: Noncontributory except per HPI     Past Surgical History: Noncontributory except per HPI     Family History: Reviewed and noncontributory     Social History:  Denies tobacco. Denies ETOH.  Smokes weed as recently as today.    Social Determinants Affecting Care: N/A    No Known Allergies    Home Meds:   Current Outpatient Medications   Medication Instructions    amLODIPine (NORVASC) 5 mg, oral, Daily, for blood pressure    hydroCHLOROthiazide (MICROZIDE) 12.5 mg, oral, Daily    mesalamine (CANASA) 1,000 mg, rectal, Nightly    omeprazole (PRILOSEC) 20 mg, oral, 2 times daily        Physical Exam     ED Triage Vitals [04/03/24 1026]   Temperature Heart Rate Respirations BP   37.1 °C (98.7 °F) (!) 136 (!) 22 (!) 145/92      Pulse Ox Temp src Heart Rate Source Patient Position   97 % -- -- --      BP Location FiO2 (%)     -- --    "      Heart Rate:  []   Temperature:  [37.1 °C (98.7 °F)]   Respirations:  [18-22]   BP: (120-147)/()   Height:  [190.5 cm (6' 3\")]   Weight:  [86.2 kg (190 lb)]   Pulse Ox:  [95 %-99 %]      Physical Exam  Vitals and nursing note reviewed.     CONSTITUTIONAL: Well appearing, well nourished, in no acute distress.   HENMT: Head atraumatic. Airway patent. Nasal mucosa clear. Mouth with normal mucosa, clear oropharynx. Uvula midline. Neck supple.    EYES: Clear bilaterally, pupils equally round and reactive to light.   CARDIOVASCULAR: Tachycardic, regular rhythm.  Heart sounds S1, S2.  No murmurs, rubs or gallops. Normal pulses. Capillary refill < 2 sec.   RESPIRATORY: No increased work of breathing. Breath sounds clear and equal bilaterally.  GASTROINTESTINAL: Abdomen soft, non-distended, mild tenderness in the epigastrium. No rebound, no guarding. Normal bowel sounds. No palpable masses.  GENITOURINARY:  No CVA tenderness.  MUSCULOSKELETAL: Spine appears normal, range of motion is not limited, no muscle or joint tenderness. No edema.   NEUROLOGICAL: Alert and oriented, no asymmetry, moving all extremities equally.  SKIN: Warm, dry and intact. No rash or notable lesions.  PSYCHIATRIC: Normal mood and affect.  HEME/LYMPH: No adenopathy or splenomegaly.    Diagnostic Results      ECG: ECGs read and interpreted by me. See ED Course, below, for interpretation.    Labs Reviewed   CBC WITH AUTO DIFFERENTIAL - Abnormal       Result Value    WBC 14.0 (*)     nRBC 0.0      RBC 4.68      Hemoglobin 13.6      Hematocrit 39.0 (*)     MCV 83      MCH 29.1      MCHC 34.9      RDW 12.9      Platelets 513 (*)     Neutrophils % 67.1      Immature Granulocytes %, Automated 0.4      Lymphocytes % 20.5      Monocytes % 9.4      Eosinophils % 2.4      Basophils % 0.2      Neutrophils Absolute 9.41 (*)     Immature Granulocytes Absolute, Automated 0.05      Lymphocytes Absolute 2.87      Monocytes Absolute 1.32 (*)     " Eosinophils Absolute 0.33      Basophils Absolute 0.03     COMPREHENSIVE METABOLIC PANEL - Abnormal    Glucose 120 (*)     Sodium 134 (*)     Potassium 4.1      Chloride 91 (*)     Bicarbonate 31      Anion Gap 16      Urea Nitrogen 20      Creatinine 1.25      eGFR 70      Calcium 10.6 (*)     Albumin 4.2      Alkaline Phosphatase 102      Total Protein 10.1 (*)     AST 14      Bilirubin, Total 0.4      ALT 9 (*)    URINALYSIS WITH REFLEX CULTURE AND MICROSCOPIC - Abnormal    Color, Urine Light-Yellow      Appearance, Urine Clear      Specific Gravity, Urine 1.016      pH, Urine 5.5      Protein, Urine NEGATIVE      Glucose, Urine Normal      Blood, Urine NEGATIVE      Ketones, Urine TRACE (*)     Bilirubin, Urine NEGATIVE      Urobilinogen, Urine Normal      Nitrite, Urine NEGATIVE      Leukocyte Esterase, Urine NEGATIVE     SEDIMENTATION RATE, AUTOMATED - Abnormal    Sedimentation Rate 110 (*)    BLOOD CULTURE - Normal    Blood Culture Loaded on Instrument - Culture in progress     BLOOD CULTURE - Normal    Blood Culture Loaded on Instrument - Culture in progress     LACTATE - Normal    Lactate 1.2      Narrative:     Venipuncture immediately after or during the administration of Metamizole may lead to falsely low results. Testing should be performed immediately  prior to Metamizole dosing.   LIPASE - Normal    Lipase 9      Narrative:     Venipuncture immediately after or during the administration of Metamizole may lead to falsely low results. Testing should be performed immediately prior to Metamizole dosing.   TROPONIN I, HIGH SENSITIVITY - Normal    Troponin I, High Sensitivity 5      Narrative:     Less than 99th percentile of normal range cutoff-  Female and children under 18 years old <14 ng/L; Male <21 ng/L: Negative  Repeat testing should be performed if clinically indicated.     Female and children under 18 years old 14-50 ng/L; Male 21-50 ng/L:  Consistent with possible cardiac damage and possible  increased clinical   risk. Serial measurements may help to assess extent of myocardial damage.     >50 ng/L: Consistent with cardiac damage, increased clinical risk and  myocardial infarction. Serial measurements may help assess extent of   myocardial damage.      NOTE: Children less than 1 year old may have higher baseline troponin   levels and results should be interpreted in conjunction with the overall   clinical context.     NOTE: Troponin I testing is performed using a different   testing methodology at [facility] than at other   [facility]. Direct result comparisons should only   be made within the same method.   SARS-COV-2 AND INFLUENZA A/B PCR - Normal    Flu A Result Not Detected      Flu B Result Not Detected      Coronavirus 2019, PCR Not Detected      Narrative:     This assay has received FDA Emergency Use Authorization (EUA) and  is only authorized for the duration of time that circumstances exist to justify the authorization of the emergency use of in vitro diagnostic tests for the detection of SARS-CoV-2 virus and/or diagnosis of COVID-19 infection under section 564(b)(1) of the Act, 21 U.S.C. 360bbb-3(b)(1). Testing for SARS-CoV-2 is only recommended for patients who meet current clinical and/or epidemiological criteria as defined by federal, state, or local public health directives. This assay is an in vitro diagnostic nucleic acid amplification test for the qualitative detection of SARS-CoV-2, Influenza A, and Influenza B from nasopharyngeal specimens and has been validated for use at [facility]. Negative results do not preclude COVID-19 infections or Influenza A/B infections, and should not be used as the sole basis for diagnosis, treatment, or other management decisions. If Influenza A/B and RSV PCR results are negative, testing for Parainfluenza virus, Adenovirus and Metapneumovirus is routinely performed for [facility] [place] and intensive care  inpatients, and is available on other patients by placing an add-on request.    RSV PCR - Normal    RSV PCR Not Detected      Narrative:     This assay is an FDA-cleared, in vitro diagnostic nucleic acid amplification test for the detection of RSV from nasopharyngeal specimens, and has been validated for use at [facility]. Negative results do not preclude RSV infections, and should not be used as the sole basis for diagnosis, treatment, or other management decisions. If Influenza A/B and RSV PCR results are negative, testing for Parainfluenza virus, Adenovirus and Metapneumovirus is routinely performed for pediatric oncology and intensive care inpatients at [facility], and is available on other patients by placing an add-on request.       URINALYSIS WITH REFLEX CULTURE AND MICROSCOPIC    Narrative:     The following orders were created for panel order Urinalysis with Reflex Culture and Microscopic.  Procedure                               Abnormality         Status                     ---------                               -----------         ------                     Urinalysis with Reflex C...[[id number]]  Abnormal            Final result               Extra Urine Gray Tube[[id number]]                            In process                   Please view results for these tests on the individual orders.   EXTRA URINE GRAY TUBE   C-REACTIVE PROTEIN         CT abdomen pelvis w IV contrast   Final Result   1. Mild segmental wall thickening of the distal sigmoid colon and   rectum again noted with multiple prominent perirectal lymph nodes   measuring up to 1 cm. Findings may be secondary to a mild segmental   colitis/proctitis, although difficult to exclude underlying neoplastic   process. Recommend further evaluation with colonoscopy if not   previously performed.   2. Stable mildly prominent lymph nodes in the right lower quadrant   again noted measuring up to approximately 1.2 cm.   3. Mild hepatic  steatosis.   4. Stable nonspecific sclerosis of L4 and L5 vertebral bodies and also   multilevel anterior sclerosis of T9 through L3 and anterior osseous   spurring. Osseous fusion of the bilateral sacroiliac joints with   sclerosis and lucency, findings concerning for underlying   spondyloarthropathy, and follow-up rheumatology   consultation/evaluation is recommended.         Signed by Frank Olivia MD      XR chest 2 views   Final Result   No evidence of acute intrathoracic abnormality.        Signed by: Chance Pillai 4/3/2024 11:51 AM   Dictation workstation:   [id number]                    No data recorded                Procedure  Procedures    ED Course & MDM   Assessment/Plan:   Tha Leon Jr. is a 48 y.o. male with a history of HTN, anemia, ulcerative proctitis, peptic ulcer disease attributed to NSAID use and H. pylori, presents with epigastric pain, anorexia, and induced to see a.  He is notably tachycardic on presentation, otherwise well-appearing with mild tenderness in the epigastrium.  CT was performed at most recent ED visit and showed nonspecific prominent right lower quadrant lymph nodes and pelvic lymph nodes possibly reactive in nature such as from inflammatory process of the bowel or malignancy.  Suspect recurrent PUD, other possibilities include gastroenteritis, other inflammatory process.  Of note his wife is here with similar symptoms and fever, patient is notably afebrile.  Workup was initiated with labs, urinalysis, and repeat CT given significant tachycardia and uncontrolled symptoms. See below for details of ED course and ultimate disposition.    Medications   piperacillin-tazobactam-dextrose (Zosyn) IV 3.375 g (has no administration in time range)   sodium chloride 0.9 % bolus 1,000 mL (0 mL intravenous Stopped 4/3/24 0392)   acetaminophen (Tylenol) tablet 650 mg (650 mg oral Given 4/3/24 1100)   famotidine PF (Pepcid) injection 20 mg (20 mg intravenous Given 4/3/24 1244)    lidocaine-diphenhydrAMINE-Maalox 1:1:1 Magic Mouthwash (10 mL Swish & Swallow Given 4/3/24 1244)   iohexol (OMNIPaque) 350 mg iodine/mL solution 75 mL (75 mL intravenous Given 4/3/24 1509)        ED Course as of 04/03/24 1659   Wed Apr 03, 2024   1056 ECG read interpreted by me.  Sinus tachycardia, rate 136.  Normal axis.  Normal intervals.  Right atrial enlargement.  LVH.  Inferior lateral ST depressions. [CG]   1343 Labs are notable for CMP with mild hyponatremia 134, hypochloremia 91, normal renal function, mildly elevated calcium 10.6, normal liver function, CBC with stable leukocytosis 14.0, normal H&H, elevated platelets 513, normal lactate, normal lipase, negative viral swabs, negative troponin. [CG]   1518 UA negative. [CG]   1620 CTAP shows mild segmental wall thickening of the distal sigmoid colon and rectum again noted with multiple prominent perirectal lymph nodes measuring up to 1 cm possibly secondary to mild segmental colitis/proctitis cannot rule out underlying neoplastic process, stable mildly prominent lymph nodes in the right lower quadrant again measuring up to 1.2 cm, mild hepatic steatosis, stable vertebral changes. 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 He has not followed up with his stool or breath test studies. She is concerned about a possible infectious process with his elevated WBC and overall poor clinical status. If still not doing well she would consider admission for GI consultation and possible scope. Recommends adding on inflammatory markers. ESR is notably elevated at 110. He was reevaluated, does not feel comfortably going home, states he can't eat. He was started on zosyn and admitted under observation for further management. [CG]      ED Course User Index  [CG] Delores Hughes MD         Diagnoses as of 04/03/24 1659   Proctitis   Peptic ulcer disease       Disposition:   Admitted to OBS team, discussed differential and  findings with patient as well as any family members at bedside.      ED Prescriptions    None         Delores Hughes MD  EM/IM/Peds    This note was dictated by speech recognition. Minor errors in transcription may be present.     Delores Hughes MD  04/03/24 1700

## 2024-04-04 ENCOUNTER — APPOINTMENT (OUTPATIENT)
Dept: GASTROENTEROLOGY | Facility: HOSPITAL | Age: 49
End: 2024-04-04
Payer: COMMERCIAL

## 2024-04-04 ENCOUNTER — ANESTHESIA (OUTPATIENT)
Dept: GASTROENTEROLOGY | Facility: HOSPITAL | Age: 49
End: 2024-04-04
Payer: COMMERCIAL

## 2024-04-04 ENCOUNTER — APPOINTMENT (OUTPATIENT)
Dept: PRIMARY CARE | Facility: CLINIC | Age: 49
End: 2024-04-04
Payer: COMMERCIAL

## 2024-04-04 ENCOUNTER — ANESTHESIA EVENT (OUTPATIENT)
Dept: GASTROENTEROLOGY | Facility: HOSPITAL | Age: 49
End: 2024-04-04
Payer: COMMERCIAL

## 2024-04-04 VITALS
RESPIRATION RATE: 17 BRPM | HEIGHT: 75 IN | SYSTOLIC BLOOD PRESSURE: 138 MMHG | WEIGHT: 190 LBS | OXYGEN SATURATION: 98 % | HEART RATE: 98 BPM | BODY MASS INDEX: 23.62 KG/M2 | TEMPERATURE: 98.1 F | DIASTOLIC BLOOD PRESSURE: 98 MMHG

## 2024-04-04 LAB
ALBUMIN SERPL BCP-MCNC: 3.8 G/DL (ref 3.4–5)
ALP SERPL-CCNC: 98 U/L (ref 33–120)
ALT SERPL W P-5'-P-CCNC: 9 U/L (ref 10–52)
ANION GAP SERPL CALC-SCNC: 16 MMOL/L (ref 10–20)
AST SERPL W P-5'-P-CCNC: 14 U/L (ref 9–39)
ATRIAL RATE: 136 BPM
BILIRUB DIRECT SERPL-MCNC: 0.1 MG/DL (ref 0–0.3)
BILIRUB SERPL-MCNC: 0.5 MG/DL (ref 0–1.2)
BUN SERPL-MCNC: 15 MG/DL (ref 6–23)
CALCIUM SERPL-MCNC: 9.9 MG/DL (ref 8.6–10.3)
CHLORIDE SERPL-SCNC: 94 MMOL/L (ref 98–107)
CO2 SERPL-SCNC: 27 MMOL/L (ref 21–32)
CREAT SERPL-MCNC: 1.03 MG/DL (ref 0.5–1.3)
EGFRCR SERPLBLD CKD-EPI 2021: 88 ML/MIN/1.73M*2
ERYTHROCYTE [DISTWIDTH] IN BLOOD BY AUTOMATED COUNT: 13 % (ref 11.5–14.5)
EST. AVERAGE GLUCOSE BLD GHB EST-MCNC: 117 MG/DL
GLUCOSE SERPL-MCNC: 114 MG/DL (ref 74–99)
HBA1C MFR BLD: 5.7 %
HCT VFR BLD AUTO: 35.6 % (ref 41–52)
HGB BLD-MCNC: 11.8 G/DL (ref 13.5–17.5)
HOLD SPECIMEN: NORMAL
MAGNESIUM SERPL-MCNC: 1.7 MG/DL (ref 1.6–2.4)
MCH RBC QN AUTO: 28.7 PG (ref 26–34)
MCHC RBC AUTO-ENTMCNC: 33.1 G/DL (ref 32–36)
MCV RBC AUTO: 87 FL (ref 80–100)
NRBC BLD-RTO: 0 /100 WBCS (ref 0–0)
P AXIS: 74 DEGREES
P OFFSET: 214 MS
P ONSET: 163 MS
PLATELET # BLD AUTO: 456 X10*3/UL (ref 150–450)
POTASSIUM SERPL-SCNC: 3.7 MMOL/L (ref 3.5–5.3)
PR INTERVAL: 122 MS
PROT SERPL-MCNC: 9.1 G/DL (ref 6.4–8.2)
Q ONSET: 224 MS
QRS COUNT: 23 BEATS
QRS DURATION: 78 MS
QT INTERVAL: 302 MS
QTC CALCULATION(BAZETT): 454 MS
QTC FREDERICIA: 396 MS
R AXIS: 76 DEGREES
RBC # BLD AUTO: 4.11 X10*6/UL (ref 4.5–5.9)
SODIUM SERPL-SCNC: 133 MMOL/L (ref 136–145)
T AXIS: 36 DEGREES
T OFFSET: 375 MS
VENTRICULAR RATE: 136 BPM
WBC # BLD AUTO: 13.3 X10*3/UL (ref 4.4–11.3)

## 2024-04-04 PROCEDURE — G0378 HOSPITAL OBSERVATION PER HR: HCPCS

## 2024-04-04 PROCEDURE — 80053 COMPREHEN METABOLIC PANEL: CPT | Performed by: NURSE PRACTITIONER

## 2024-04-04 PROCEDURE — 2500000001 HC RX 250 WO HCPCS SELF ADMINISTERED DRUGS (ALT 637 FOR MEDICARE OP): Performed by: PHYSICIAN ASSISTANT

## 2024-04-04 PROCEDURE — 45330 DIAGNOSTIC SIGMOIDOSCOPY: CPT

## 2024-04-04 PROCEDURE — 7100000009 HC PHASE TWO TIME - INITIAL BASE CHARGE

## 2024-04-04 PROCEDURE — 83735 ASSAY OF MAGNESIUM: CPT | Performed by: NURSE PRACTITIONER

## 2024-04-04 PROCEDURE — 88342 IMHCHEM/IMCYTCHM 1ST ANTB: CPT | Performed by: PATHOLOGY

## 2024-04-04 PROCEDURE — 88305 TISSUE EXAM BY PATHOLOGIST: CPT | Mod: TC | Performed by: STUDENT IN AN ORGANIZED HEALTH CARE EDUCATION/TRAINING PROGRAM

## 2024-04-04 PROCEDURE — 99232 SBSQ HOSP IP/OBS MODERATE 35: CPT | Performed by: NURSE PRACTITIONER

## 2024-04-04 PROCEDURE — 85027 COMPLETE CBC AUTOMATED: CPT | Performed by: NURSE PRACTITIONER

## 2024-04-04 PROCEDURE — 82248 BILIRUBIN DIRECT: CPT | Performed by: NURSE PRACTITIONER

## 2024-04-04 PROCEDURE — 88305 TISSUE EXAM BY PATHOLOGIST: CPT | Performed by: PATHOLOGY

## 2024-04-04 PROCEDURE — 99221 1ST HOSP IP/OBS SF/LOW 40: CPT | Performed by: NURSE PRACTITIONER

## 2024-04-04 PROCEDURE — 83036 HEMOGLOBIN GLYCOSYLATED A1C: CPT | Mod: AHULAB | Performed by: NURSE PRACTITIONER

## 2024-04-04 PROCEDURE — 7100000010 HC PHASE TWO TIME - EACH INCREMENTAL 1 MINUTE

## 2024-04-04 PROCEDURE — 45380 COLONOSCOPY AND BIOPSY: CPT

## 2024-04-04 PROCEDURE — 36415 COLL VENOUS BLD VENIPUNCTURE: CPT | Performed by: NURSE PRACTITIONER

## 2024-04-04 PROCEDURE — 43239 EGD BIOPSY SINGLE/MULTIPLE: CPT

## 2024-04-04 PROCEDURE — 2500000004 HC RX 250 GENERAL PHARMACY W/ HCPCS (ALT 636 FOR OP/ED)

## 2024-04-04 PROCEDURE — 3700000002 HC GENERAL ANESTHESIA TIME - EACH INCREMENTAL 1 MINUTE

## 2024-04-04 PROCEDURE — 2500000005 HC RX 250 GENERAL PHARMACY W/O HCPCS: Performed by: NURSE PRACTITIONER

## 2024-04-04 PROCEDURE — 43239 EGD BIOPSY SINGLE/MULTIPLE: CPT | Performed by: STUDENT IN AN ORGANIZED HEALTH CARE EDUCATION/TRAINING PROGRAM

## 2024-04-04 PROCEDURE — 2500000004 HC RX 250 GENERAL PHARMACY W/ HCPCS (ALT 636 FOR OP/ED): Performed by: NURSE PRACTITIONER

## 2024-04-04 PROCEDURE — 2500000001 HC RX 250 WO HCPCS SELF ADMINISTERED DRUGS (ALT 637 FOR MEDICARE OP): Performed by: NURSE PRACTITIONER

## 2024-04-04 PROCEDURE — 3700000001 HC GENERAL ANESTHESIA TIME - INITIAL BASE CHARGE

## 2024-04-04 RX ORDER — PROPOFOL 10 MG/ML
INJECTION, EMULSION INTRAVENOUS CONTINUOUS PRN
Status: DISCONTINUED | OUTPATIENT
Start: 2024-04-04 | End: 2024-04-04

## 2024-04-04 RX ORDER — MIDAZOLAM HYDROCHLORIDE 1 MG/ML
INJECTION INTRAMUSCULAR; INTRAVENOUS AS NEEDED
Status: DISCONTINUED | OUTPATIENT
Start: 2024-04-04 | End: 2024-04-04

## 2024-04-04 RX ORDER — FENTANYL CITRATE 50 UG/ML
INJECTION, SOLUTION INTRAMUSCULAR; INTRAVENOUS AS NEEDED
Status: DISCONTINUED | OUTPATIENT
Start: 2024-04-04 | End: 2024-04-04

## 2024-04-04 RX ADMIN — SODIUM CHLORIDE, POTASSIUM CHLORIDE, SODIUM LACTATE AND CALCIUM CHLORIDE 75 ML/HR: 600; 310; 30; 20 INJECTION, SOLUTION INTRAVENOUS at 08:45

## 2024-04-04 RX ADMIN — PROPOFOL 100 MCG/KG/MIN: 10 INJECTION, EMULSION INTRAVENOUS at 16:39

## 2024-04-04 RX ADMIN — FENTANYL CITRATE 50 MCG: 50 INJECTION, SOLUTION INTRAMUSCULAR; INTRAVENOUS at 16:40

## 2024-04-04 RX ADMIN — PROPOFOL 50000 MCG: 10 INJECTION, EMULSION INTRAVENOUS at 16:46

## 2024-04-04 RX ADMIN — HYDROCHLOROTHIAZIDE 12.5 MG: 12.5 TABLET ORAL at 08:46

## 2024-04-04 RX ADMIN — PROPOFOL 70000 MCG: 10 INJECTION, EMULSION INTRAVENOUS at 16:40

## 2024-04-04 RX ADMIN — FENTANYL CITRATE 50 MCG: 50 INJECTION, SOLUTION INTRAMUSCULAR; INTRAVENOUS at 16:47

## 2024-04-04 RX ADMIN — Medication 6 MG: at 00:30

## 2024-04-04 RX ADMIN — PIPERACILLIN SODIUM AND TAZOBACTAM SODIUM 3.38 G: 3; .375 INJECTION, SOLUTION INTRAVENOUS at 06:02

## 2024-04-04 RX ADMIN — PANTOPRAZOLE SODIUM 40 MG: 40 TABLET, DELAYED RELEASE ORAL at 06:01

## 2024-04-04 RX ADMIN — AMLODIPINE BESYLATE 5 MG: 5 TABLET ORAL at 08:46

## 2024-04-04 RX ADMIN — MIDAZOLAM HYDROCHLORIDE 2 MG: 1 INJECTION INTRAMUSCULAR; INTRAVENOUS at 16:39

## 2024-04-04 RX ADMIN — PIPERACILLIN SODIUM AND TAZOBACTAM SODIUM 3.38 G: 3; .375 INJECTION, SOLUTION INTRAVENOUS at 11:35

## 2024-04-04 RX ADMIN — SODIUM PHOSPHATE, DIBASIC AND SODIUM PHOSPHATE, MONOBASIC 1 ENEMA: 7; 19 ENEMA RECTAL at 10:30

## 2024-04-04 SDOH — HEALTH STABILITY: MENTAL HEALTH: CURRENT SMOKER: 0

## 2024-04-04 ASSESSMENT — COGNITIVE AND FUNCTIONAL STATUS - GENERAL
MOBILITY SCORE: 24
DAILY ACTIVITIY SCORE: 24

## 2024-04-04 ASSESSMENT — PAIN SCALES - GENERAL
PAINLEVEL_OUTOF10: 2
PAINLEVEL_OUTOF10: 0 - NO PAIN
PAINLEVEL_OUTOF10: 0 - NO PAIN

## 2024-04-04 ASSESSMENT — PAIN - FUNCTIONAL ASSESSMENT
PAIN_FUNCTIONAL_ASSESSMENT: 0-10

## 2024-04-04 ASSESSMENT — ACTIVITIES OF DAILY LIVING (ADL): LACK_OF_TRANSPORTATION: NO

## 2024-04-04 NOTE — INTERVAL H&P NOTE
H&P reviewed. The patient was examined and there are no changes to the H&P.    Plan for EGD for eval of 1 yr of epigastric pain and intermittent melena no improvement with BID PPI, hx of h.pylori and flexible sigmoidoscopy for re-eval of known ulcerative proctitis, CT with distal sigmoid thickening, can not r/o mass but likely due to underlying IBD.

## 2024-04-04 NOTE — H&P (VIEW-ONLY)
Reason For Consult    gi complaints outside gi thought might need scopes for infecitous concerns       History Of Present Illness  Tha Leon Jr. is a 48 y.o. male with h/o HTN, anemia, ulcerative proctitis, peptic ulcer disease attributed to NSAID use and H. pylori, presented with c/o epigastric pain.  Patient stated he has been having epigastric pain for the past year.  Stated his symptoms are getting progressively worse since Wednesday.  The pain usually precipitated by eating or drinking any fluids, approximately 30 minutes after eating, and then last throughout the day, with intermittent worsening every 20 minutes.  He denies nausea or vomiting.  Stated he has not been eating for the past 2 days.  He takes Nexium over-the-counter twice a day and Tylenol for pain in the morning, no NSAID use.  He has history of ulcerative proctitis, in the past he was on Canasa suppository.  Stated he resumed the suppository 4 days ago.  His last EGD and colonoscopy in 2022 see reports below.  He was diagnosed with H. pylori gastritis, stated he had similar symptoms over time and they improved after completion of the antibiotics.  He also mentioned that he was taking the suppository after colonoscopy and stopped using them once symptoms resolved.  He reports intermittent black tarry stools, usually small amount, denies diarrhea.  Has had loose stools with passing flatus on Sunday, no BM today.  Reports significant weight loss.  He follows with GI as outpatient, was planning to have flexible sigmoidoscopy.  On admission labs showed leukocytosis, H&H 11.8 and 35.6, BUN 15, creatinine 1.03, CRP 17.28, .  CT showed mild sigmoid wall thickening of the distal sigmoid colon and rectum, see full report below.  He denies rectal pain    Prev endoscopic eval:   EGD 11/30/2022: - Normal esophagus. - Z-line regular, 40 cm from the incisors. - Erythematous mucosa in the gastric body. Biopsied. - Erythematous congested duodenopathy.  - The examination was otherwise normal.      Colonoscopy 11/30/2022: - Preparation of the colon was fair. - Aphtha in the terminal ileum. Biopsied. - Localized moderate inflammation was found in the mid rectum and in the distal rectum, rule out ulcerative colitis. Biopsied. - The examination was otherwise normal on direct and retroflexion views. - Biopsies for surveillance were taken from the cecum, ascending colon, transverse colon, descending colon and sigmoid colon.      Pathology:   A. GASTRIC ANTRUM AND BODY, BIOPSIES: --ACTIVE HELICOBACTER GASTRITIS. Note: Immunohistochemical staining highlights numerous microorganisms morphologically consistent with Helicobacter. The H. pylori drug susceptibility assay has been ordered and the results will appear separately in an addendum report.   B. TERMINAL ILEUM, BIOPSY: --FOCAL ACTIVE ILEITIS, NO GRANULOMAS IDENTIFIED.   C. CECUM AND ASCENDING COLON, BIOPSIES: --COLONIC MUCOSA, NO SIGNIFICANT HISTOPATHOLOGICAL ABNORMALITIES.   D. TRANSVERSE COLON, COLD BIOPSY: --COLONIC MUCOSA, NO SIGNIFICANT HISTOPATHOLOGICAL ABNORMALITIES.   E. DESCENDING AND SIGMOID COLON, BIOPSIES: --COLONIC MUCOSA, NO SIGNIFICANT HISTOPATHOLOGICAL ABNORMALITIES.   F. RECTUM, COLD BIOPSY: --DIFFUSE ACTIVE CHRONIC COLITIS, NO GRANULOMAS IDENTIFIED. Note: Histopathological features suggest ulcerative proctitis. However, infection associated proctitis and/or medication effects may cause similar histopathological alterations.      EGD 12/2019 for anemia/melena: report not found, prog note from hospitalization notes 3 antral ulcers  Pathology 12/2019:   Stomach, antrum, biopsy - Chronic active gastritis with H. pylori-like organisms; see comment.  COMMENT: H. pylori-like organisms are identified on routine H&E stained sections.      Past Medical History  He has a past medical history of Personal history of other diseases of the circulatory system and Personal history of other diseases of the  "musculoskeletal system and connective tissue.    Surgical History  He has no past surgical history on file.     Social History  He reports that he has never smoked. He has never been exposed to tobacco smoke. He has never used smokeless tobacco. He reports current alcohol use of about 4.0 standard drinks of alcohol per week. He reports current drug use. Drug: Marijuana.    Family History  No family history on file.     Allergies  Patient has no known allergies.    Review of Systems  10 systems reviewed and negative other than HPI     Physical Exam  Physical Exam  Vitals reviewed.   Constitutional:       General: He is not in acute distress.     Appearance: He is underweight. He is ill-appearing. He is not toxic-appearing or diaphoretic.   HENT:      Head: Normocephalic and atraumatic.      Nose: Nose normal.      Mouth/Throat:      Mouth: Mucous membranes are moist.      Pharynx: Oropharynx is clear.   Eyes:      Conjunctiva/sclera: Conjunctivae normal.   Cardiovascular:      Rate and Rhythm: Normal rate and regular rhythm.      Pulses: Normal pulses.      Heart sounds: Normal heart sounds.   Pulmonary:      Effort: Pulmonary effort is normal.      Breath sounds: Normal breath sounds.   Abdominal:      General: Bowel sounds are normal. There is no distension.      Palpations: Abdomen is soft.      Tenderness: There is abdominal tenderness. There is no guarding or rebound.      Hernia: No hernia is present.   Musculoskeletal:      Cervical back: Neck supple.   Skin:     General: Skin is warm and dry.   Neurological:      General: No focal deficit present.      Mental Status: He is alert and oriented to person, place, and time.   Psychiatric:         Mood and Affect: Mood normal.         Behavior: Behavior normal.            Last Recorded Vitals  Blood pressure 125/90, pulse 97, temperature 36.8 °C (98.2 °F), temperature source Temporal, resp. rate 17, height 1.905 m (6' 3\"), weight 86.2 kg (190 lb), SpO2 97 " %.    Relevant Results      Scheduled medications  amLODIPine, 5 mg, oral, Daily  hydroCHLOROthiazide, 12.5 mg, oral, Daily  melatonin, 6 mg, oral, Nightly  mesalamine, 1,000 mg, rectal, Nightly  pantoprazole, 40 mg, oral, BID AC   Or  pantoprazole, 40 mg, intravenous, BID AC  piperacillin-tazobactam, 3.375 g, intravenous, q6h      Continuous medications  lactated Ringer's, 75 mL/hr, Last Rate: 75 mL/hr (04/04/24 0645)      PRN medications  PRN medications: traZODone  Electrocardiogram, 12-lead PRN ACS symptoms    Result Date: 4/4/2024  Sinus tachycardia Right atrial enlargement Minimal voltage criteria for LVH, may be normal variant ( Sokolow-Bird ) Nonspecific ST abnormality Abnormal ECG When compared with ECG of 28-MAR-2024 00:06, Vent. rate has increased BY  52 BPM ST now depressed in Inferior leads ST now depressed in Anterolateral leads T wave amplitude has increased in Anterior leads    CT abdomen pelvis w IV contrast    Result Date: 4/3/2024    1. Mild segmental wall thickening of the distal sigmoid colon and rectum again noted with multiple prominent perirectal lymph nodes measuring up to 1 cm. Findings may be secondary to a mild segmental colitis/proctitis, although difficult to exclude underlying neoplastic process. Recommend further evaluation with colonoscopy if not previously performed. 2. Stable mildly prominent lymph nodes in the right lower quadrant again noted measuring up to approximately 1.2 cm. 3. Mild hepatic steatosis. 4. Stable nonspecific sclerosis of L4 and L5 vertebral bodies and also multilevel anterior sclerosis of T9 through L3 and anterior osseous spurring. Osseous fusion of the bilateral sacroiliac joints with sclerosis and lucency, findings concerning for underlying spondyloarthropathy, and follow-up rheumatology consultation/evaluation is recommended.      Signed by Frank Olivia MD    XR chest 2 views    Result Date: 4/3/2024  Interpreted By:  Chance Pillai, STUDY: XR CHEST 2 VIEWS    INDICATION: Signs/Symptoms:Abdominal pain.   COMPARISON: None   ACCESSION NUMBER(S): [id number]   ORDERING CLINICIAN: OLIVA VILLAFUERTE   FINDINGS: No consolidation, effusion, edema, or pneumothorax. Heart size within normal limits. Suspected developing thoracic DISH.       No evidence of acute intrathoracic abnormality.   Signed by: Chance Pillai 4/3/2024 11:51 AM Dictation workstation:   [id number]    ECG 12 lead    Result Date: 3/29/2024  Normal sinus rhythm Normal ECG No previous ECGs available See ED provider note for full interpretation and clinical correlation Confirmed by Adelina Rogers ([id number]) on 3/29/2024 11:13:21 AM    CT abdomen pelvis w IV contrast    Result Date: 3/28/2024    Nonspecific prominent right lower quadrant lymph nodes which measure 1.4 cm in size. There are also prominent posterior and inferior pelvic lymph nodes at level of the sigmoid colon and rectum which measure up to 1.2 cm. The lymph nodes may be reactive in nature, such as secondary to inflammatory process of the bowel, however other underlying pathology including malignancy is not excluded and further evaluation is recommended. Colonoscopy is also recommended for evaluation to exclude underlying pathology of bowel.   Prostatomegaly; please correlate with PSA.   Nonspecific sclerosis of L4 and L5 vertebral bodies and also multilevel anterior sclerosis of T9 through L3 and anterior osseous spurring. Osseous fusion of the bilateral sacroiliac joints with sclerosis and lucency, findings concerning for underlying spondyloarthropathy, and follow-up rheumatology consultation/evaluation is recommended.       MACRO: None   Signed by: Víctor Villatoro 3/28/2024 3:48 AM Dictation workstation:   [id number]        Assessment/Plan     48 y.o. male with h/o HTN, anemia, ulcerative proctitis, peptic ulcer disease attributed to NSAID use and H. pylori, presented with c/o acute on chronic epigastric pain precipitated by meals or any fluids, no  associated vomiting, not relieved by over-the-counter PPI.  Intermittent black stools.  Has history of ulcerative proctitis, started on Canasa suppository 4 days ago.  CT showed proctitis.  Possible etiology of his symptoms include PUD, gastritis, H. pylori, could be related to worsening of proctitis.    -N.p.o.  -Recommend EGD and flexible sigmoidoscopy to perform today.  Discussed with Dr. Adams.  -Will give 2 enemas prior to the procedure.  -PPI and supportive care with antiemetics as needed  Further recommendations pending above results      I spent 45 minutes in the professional and overall care of this patient.      Cristina Valdes, APRN-CNP

## 2024-04-04 NOTE — PROGRESS NOTES
Tha Leon Jr. is a 48 y.o. male who presented to ed on 4/3/2024 presenting with Proctitis.    Subjective    Resting in bed comfortably.  No apparent distress.  Denies any nausea vomiting at this time.  Denies any abdominal pain at this time.  States he is just waiting on GI so they can get a plan together.    Review of systems   Negative except as noted above        Objective   Physical Exam   Physical Exam  Constitutional:       General: He is not in acute distress.     Appearance: Normal appearance.   HENT:      Head: Normocephalic and atraumatic.      Mouth/Throat:      Mouth: Mucous membranes are moist.   Eyes:      Extraocular Movements: Extraocular movements intact.      Conjunctiva/sclera: Conjunctivae normal.   Cardiovascular:      Rate and Rhythm: Normal rate and regular rhythm.      Pulses: Normal pulses.      Heart sounds: Normal heart sounds. No murmur heard.  Pulmonary:      Effort: Pulmonary effort is normal. No respiratory distress.      Breath sounds: Normal breath sounds. No wheezing or rhonchi.   Chest:      Chest wall: No tenderness.   Abdominal:      General: Abdomen is flat. Bowel sounds are normal. There is no distension.      Palpations: Abdomen is soft. There is no mass.      Tenderness: There is abdominal tenderness. There is no guarding.      Comments: Epigastric/ruq tenderness   Musculoskeletal:         General: Normal range of motion.      Cervical back: Normal range of motion and neck supple.      Right lower leg: No edema.      Left lower leg: No edema.   Skin:     General: Skin is warm and dry.      Capillary Refill: Capillary refill takes less than 2 seconds.   Neurological:      General: No focal deficit present.      Mental Status: He is alert and oriented to person, place, and time.   Psychiatric:         Mood and Affect: Mood normal.         Last Recorded Vitals  BP (!) 135/97   Pulse 95   Temp 36.8 °C (98.2 °F) (Temporal)   Resp 18   Wt 86.2 kg (190 lb)   SpO2 100%    Intake/Output last 3 Shifts:    Intake/Output Summary (Last 24 hours) at 4/4/2024 0957  Last data filed at 4/4/2024 0645  Gross per 24 hour   Intake 1885.25 ml   Output --   Net 1885.25 ml     Admission Weight  Weight: 86.2 kg (190 lb) (04/03/24 1026)  Daily Weight  04/03/24 : 86.2 kg (190 lb)      Medications   Scheduled medications  amLODIPine, 5 mg, oral, Daily  hydroCHLOROthiazide, 12.5 mg, oral, Daily  melatonin, 6 mg, oral, Nightly  mesalamine, 1,000 mg, rectal, Nightly  pantoprazole, 40 mg, oral, BID AC   Or  pantoprazole, 40 mg, intravenous, BID AC  piperacillin-tazobactam, 3.375 g, intravenous, q6h      Continuous medications  lactated Ringer's, 75 mL/hr, Last Rate: 75 mL/hr (04/04/24 0845)      PRN medications  PRN medications: traZODone     Relevant Results  Image Results  Electrocardiogram, 12-lead PRN ACS symptoms  Sinus tachycardia  Right atrial enlargement  Minimal voltage criteria for LVH, may be normal variant ( Sokolow-Bird )  Nonspecific ST abnormality  Abnormal ECG  When compared with ECG of 28-MAR-2024 00:06,  Vent. rate has increased BY  52 BPM  ST now depressed in Inferior leads  ST now depressed in Anterolateral leads  T wave amplitude has increased in Anterior leads        Assessment/Plan   Tha Leon Jr. is a 48 y.o. male with a history of HTN, anemia, ulcerative proctitis, peptic ulcer disease attributed to NSAID use and H. pylori, presents with epigastric pain, anorexia, and induced to see a.  He is notably tachycardic on presentation, otherwise well-appearing with mild tenderness in the epigastrium.  CT was performed at most recent ED visit and showed nonspecific prominent right lower quadrant lymph nodes and pelvic lymph nodes possibly reactive in nature such as from inflammatory process of the bowel or malignancy.  Suspect recurrent PUD, other possibilities include gastroenteritis, other inflammatory process.  Of note his wife is here with similar symptoms and fever, patient is  notably afebrile.  Workup was initiated with labs, urinalysis, and repeat CT given significant tachycardia and uncontrolled symptoms. See below for details of ED course and ultimate disposition.      On interview in the ED patient tells me that a week ago when he was here for the same thing and took the GI cocktail he felt great then went home never felt better has not eaten anything for a week and returns today miserable with abdominal pain.  He had a BMX in the ED and feels better, he is being admitted for GI consult possible scopes and IV antibiotics.    Principal Problem:    Proctitis  Known to GI outpatient  No PO able for 7 days  - GI consulted for possible scopes  - Tolerating gingerale barely- npo after midnight for possible scopes   - IV PPI bid  - IVF - lr @75   - BM in the ED helped pain - better  - magic mouthwash x1 in ed   - mesalamine suppository   - IV zosyn 3.375 q6  - dietician consult. Apprec recs      HX of HTN  - Home norvasc & hydrochlorothiazide     Dvt prophylaxis   - lovenox not indicated, low risk   - scd/ambulate     Gi prophylaxis   - pantopraxole     DC plan  - DC home when medically stable    Labs/Testing reviewed:   Interdisciplinary team rounding completed with hospitalist, nurse, TCC  NP discussed plan & lab/testing results with Dr. gillespie  --- pending gi consult   45 min spent on professional & overall care of this patient    BRANNON Keyes-CNP

## 2024-04-04 NOTE — ANESTHESIA PROCEDURE NOTES
Peripheral IV  Date/Time: 4/4/2024 5:01 PM      Placement  Needle size: 20 G  Laterality: right  Location: hand  Local anesthetic: none  Site prep: alcohol  Technique: anatomical landmarks  Attempts: 1

## 2024-04-04 NOTE — DISCHARGE INSTRUCTIONS
Follow up with your primary care provider and GI doctor  Please make sure you collect stool labs with your next bowel movement and return sample to outpatient lab as soon as possible

## 2024-04-04 NOTE — CARE PLAN
Patient is status post EGD and flexible sigmoidoscopy for evaluation of chronic epigastric pain, intermittent melena, known ulcerative proctitis with abnormal CT scan    See procedure tab for full reports    EGD with moderate gastritis but no ulcers or erosions, biopsied to evaluate for H. pylori which patient had prior.  There was also moderate amount of liquid in the stomach which was suctioned.      Flexible sigmoidoscopy with mucosal changes consistent with known diagnosis of ulcerative colitis in the sigmoid continuous through the rectum,  biopsies taken    Recommendations  Resume diet  Continue PPI  Collect stool studies to rule out C. difficile and other infectious causes of diarrhea  Continue with Canasa suppositories  Follow-up with prior gastroenterologist for any modification in therapy  Consider outpatient gastric emptying study for evaluation of gastroparesis based on review biopsies    Discussed with patient

## 2024-04-04 NOTE — ANESTHESIA POSTPROCEDURE EVALUATION
Patient: Tha Leon Jr.    Procedure Summary       Date: 04/04/24 Room / Location: [facility]    Anesthesia Start: 1636 Anesthesia Stop: 1711    Procedures:       EGD      FLEXIBLE SIGMOIDOSCOPY Diagnosis:       Proctitis      Peptic ulcer disease    Scheduled Providers: Miri Adams MD; Brendan Haider MD Responsible Provider: Brendan Haider MD    Anesthesia Type: MAC ASA Status: 2            Anesthesia Type: MAC    Vitals Value Taken Time   /87 04/04/24 1724   Temp 36.7 °C (98.1 °F) 04/04/24 1709   Pulse 98 04/04/24 1724   Resp 18 04/04/24 1724   SpO2 99 % 04/04/24 1724       Anesthesia Post Evaluation    Patient location during evaluation: PACU  Patient participation: complete - patient participated  Level of consciousness: awake  Pain management: adequate  Airway patency: patent  Cardiovascular status: acceptable  Respiratory status: acceptable  Hydration status: acceptable  Postoperative Nausea and Vomiting: none        No notable events documented.

## 2024-04-04 NOTE — DISCHARGE SUMMARY
Discharge Diagnosis  Proctitis    Discharge Meds     Your medication list        CONTINUE taking these medications        Instructions Last Dose Given Next Dose Due   amLODIPine 5 mg tablet  Commonly known as: Norvasc      Take 1 tablet (5 mg) by mouth once daily. for blood pressure       ferrous sulfate 325 (65 Fe) MG EC tablet           hydroCHLOROthiazide 12.5 mg tablet  Commonly known as: Microzide      Take 1 tablet (12.5 mg) by mouth once daily.       mesalamine 1,000 mg suppository  Commonly known as: Canasa      Insert 1 suppository (1,000 mg) into the rectum once daily at bedtime.       omeprazole 20 mg DR capsule  Commonly known as: PriLOSEC           Vitamin D3 25 MCG (1000 UT) tablet  Generic drug: cholecalciferol                    Test Results Pending At Discharge  Pending Labs       Order Current Status    Surgical Pathology Exam Collected (04/04/24 0770)    Hemoglobin A1C In process    Blood Culture Preliminary result    Blood Culture Preliminary result            Hospital Course  Tha Leon Jr. is a 48 y.o. male with a history of HTN, anemia, ulcerative proctitis, peptic ulcer disease attributed to NSAID use and H. pylori, presents with epigastric pain, anorexia, and induced to see a.  He is notably tachycardic on presentation, otherwise well-appearing with mild tenderness in the epigastrium.  CT was performed at most recent ED visit and showed nonspecific prominent right lower quadrant lymph nodes and pelvic lymph nodes possibly reactive in nature such as from inflammatory process of the bowel or malignancy.  Suspect recurrent PUD, other possibilities include gastroenteritis, other inflammatory process.  Of note his wife is here with similar symptoms and fever, patient is notably afebrile.  Workup was initiated with labs, urinalysis, and repeat CT given significant tachycardia and uncontrolled symptoms. See below for details of ED course and ultimate disposition.      On interview in the ED  patient tells me that a week ago when he was here for the same thing and took the GI cocktail he felt great then went home never felt better has not eaten anything for a week and returns today miserable with abdominal pain.  He had a BMX in the ED and feels better, he is being admitted for GI consult possible scopes and IV antibiotics.    Patient seen and evaluated by gastroenterology.  EGD and flex sigmoidoscopy performed today.  Biopsies taken.  Discussed with GI and okay to discharge today with stool samples outpatient as soon as possible.  Patient does not have urge to have bowel movement at this time.  Will discharge with instructions to collect lab at home and drop off at lab.  Follow-up with primary care and gastroenterology       Pertinent Physical Exam At Time of Discharge  Physical Exam  Constitutional:       General: He is not in acute distress.     Appearance: Normal appearance.   HENT:      Head: Normocephalic and atraumatic.      Mouth/Throat:      Mouth: Mucous membranes are moist.   Eyes:      Extraocular Movements: Extraocular movements intact.      Conjunctiva/sclera: Conjunctivae normal.   Cardiovascular:      Rate and Rhythm: Normal rate and regular rhythm.      Pulses: Normal pulses.      Heart sounds: Normal heart sounds. No murmur heard.  Pulmonary:      Effort: Pulmonary effort is normal. No respiratory distress.      Breath sounds: Normal breath sounds. No wheezing or rhonchi.   Chest:      Chest wall: No tenderness.   Abdominal:      General: Abdomen is flat. Bowel sounds are normal. There is no distension.      Palpations: Abdomen is soft. There is no mass.      Tenderness: There is abdominal tenderness. There is no guarding.      Comments: Epigastric/ruq tenderness   Musculoskeletal:         General: Normal range of motion.      Cervical back: Normal range of motion and neck supple.      Right lower leg: No edema.      Left lower leg: No edema.   Skin:     General: Skin is warm and dry.       Capillary Refill: Capillary refill takes less than 2 seconds.   Neurological:      General: No focal deficit present.      Mental Status: He is alert and oriented to person, place, and time.   Psychiatric:         Mood and Affect: Mood normal.     Outpatient Follow-Up  Future Appointments   Date Time Provider Department Center   12/5/2024 11:00 AM Billy Quesada MD [id number] [facility]         Opal Navarro, APRN-CNP

## 2024-04-04 NOTE — ANESTHESIA PREPROCEDURE EVALUATION
Patient: Tha Leon Jr.    Procedure Information       Date/Time: 04/04/24 1000    Scheduled providers: Miri Adams MD; Brendan Haider MD    Procedures:       EGD      FLEXIBLE SIGMOIDOSCOPY    Location: [facility]            Relevant Problems   Cardiac   (+) Hypertension      Musculoskeletal   (+) Chronic bilateral low back pain without sciatica       Clinical information reviewed:    Allergies  Meds                Past Medical History:   Diagnosis Date    Personal history of other diseases of the circulatory system     History of hypertension    Personal history of other diseases of the musculoskeletal system and connective tissue     History of low back pain      No past surgical history on file.  Social History     Tobacco Use    Smoking status: Never     Passive exposure: Never    Smokeless tobacco: Never   Vaping Use    Vaping Use: Never used   Substance Use Topics    Alcohol use: Yes     Alcohol/week: 4.0 standard drinks of alcohol     Types: 4 Shots of liquor per week     Comment: sparingly    Drug use: Yes     Types: Marijuana      Current Outpatient Medications   Medication Instructions    amLODIPine (NORVASC) 5 mg, oral, Daily, for blood pressure    cholecalciferol (VITAMIN D3) 1,000 Units, oral, Daily    ferrous sulfate 65 mg, oral, Daily with breakfast, Do not crush, chew, or split.    hydroCHLOROthiazide (MICROZIDE) 12.5 mg, oral, Daily    mesalamine (CANASA) 1,000 mg, rectal, Nightly    omeprazole (PRILOSEC) 20 mg, oral, 2 times daily      No Known Allergies     Chemistry    Lab Results   Component Value Date/Time     (L) 04/04/2024 0625    K 3.7 04/04/2024 0625    CL 94 (L) 04/04/2024 0625    CO2 27 04/04/2024 0625    BUN 15 04/04/2024 0625    CREATININE 1.03 04/04/2024 0625    Lab Results   Component Value Date/Time    CALCIUM 9.9 04/04/2024 0625    ALKPHOS 98 04/04/2024 0625    AST 14 04/04/2024 0625    ALT 9 (L) 04/04/2024 0625    BILITOT 0.5 04/04/2024 0625     "      Lab Results   Component Value Date    HGBA1C 5.5 11/30/2023     Lab Results   Component Value Date/Time    WBC 13.3 (H) 04/04/2024 0625    HGB 11.8 (L) 04/04/2024 0625    HCT 35.6 (L) 04/04/2024 0625     (H) 04/04/2024 0625     No results found for: \"PROTIME\", \"PTT\", \"INR\"  No results found for: \"ABORH\"  Encounter Date: 04/03/24   Electrocardiogram, 12-lead PRN ACS symptoms   Result Value    Ventricular Rate 136    Atrial Rate 136    WI Interval 122    QRS Duration 78    QT Interval 302    QTC Calculation(Bazett) 454    P Axis 74    R Axis 76    T Axis 36    QRS Count 23    Q Onset 224    P Onset 163    P Offset 214    T Offset 375    QTC Fredericia 396    Narrative    Sinus tachycardia  Right atrial enlargement  Minimal voltage criteria for LVH, may be normal variant ( Sokolow-Bird )  Nonspecific ST abnormality  Abnormal ECG  When compared with ECG of 28-MAR-2024 00:06,  Vent. rate has increased BY  52 BPM  ST now depressed in Inferior leads  ST now depressed in Anterolateral leads  T wave amplitude has increased in Anterior leads  See ED provider note for full interpretation and clinical correlation  Confirmed by Adelina Rogers ([id number]) on 4/4/2024 10:59:19 AM     No results found for this or any previous visit from the past 1095 days.       Visit Vitals  BP (!) 150/92   Pulse 109   Temp 36.8 °C (98.2 °F) (Temporal)   Resp 16   Ht 1.905 m (6' 3\")   Wt 86.2 kg (190 lb)   SpO2 100%   BMI 23.75 kg/m²   Smoking Status Never   BSA 2.14 m²     NPO/Void Status  Date of Last Liquid: 04/03/24  Time of Last Liquid: 0845  Date of Last Solid: 04/03/24  Time of Last Solid: 2200  Last Intake Type: Clear fluids         Physical Exam    Airway  Mallampati: II  TM distance: >3 FB  Neck ROM: full     Cardiovascular - normal exam     Dental - normal exam     Pulmonary - normal exam     Abdominal - normal exam              Anesthesia Plan    History of general anesthesia?: no  History of complications of general " anesthesia?: no    ASA 2     MAC     The patient is not a current smoker.    intravenous induction   Anesthetic plan and risks discussed with patient.  Use of blood products discussed with patient who.    Plan discussed with [facility]AA.

## 2024-04-04 NOTE — CONSULTS
Reason For Consult    gi complaints outside gi thought might need scopes for infecitous concerns       History Of Present Illness  Tha Leon Jr. is a 48 y.o. male with h/o HTN, anemia, ulcerative proctitis, peptic ulcer disease attributed to NSAID use and H. pylori, presented with c/o epigastric pain.  Patient stated he has been having epigastric pain for the past year.  Stated his symptoms are getting progressively worse since Wednesday.  The pain usually precipitated by eating or drinking any fluids, approximately 30 minutes after eating, and then last throughout the day, with intermittent worsening every 20 minutes.  He denies nausea or vomiting.  Stated he has not been eating for the past 2 days.  He takes Nexium over-the-counter twice a day and Tylenol for pain in the morning, no NSAID use.  He has history of ulcerative proctitis, in the past he was on Canasa suppository.  Stated he resumed the suppository 4 days ago.  His last EGD and colonoscopy in 2022 see reports below.  He was diagnosed with H. pylori gastritis, stated he had similar symptoms over time and they improved after completion of the antibiotics.  He also mentioned that he was taking the suppository after colonoscopy and stopped using them once symptoms resolved.  He reports intermittent black tarry stools, usually small amount, denies diarrhea.  Has had loose stools with passing flatus on Sunday, no BM today.  Reports significant weight loss.  He follows with GI as outpatient, was planning to have flexible sigmoidoscopy.  On admission labs showed leukocytosis, H&H 11.8 and 35.6, BUN 15, creatinine 1.03, CRP 17.28, .  CT showed mild sigmoid wall thickening of the distal sigmoid colon and rectum, see full report below.  He denies rectal pain    Prev endoscopic eval:   EGD 11/30/2022: - Normal esophagus. - Z-line regular, 40 cm from the incisors. - Erythematous mucosa in the gastric body. Biopsied. - Erythematous congested duodenopathy.  - The examination was otherwise normal.      Colonoscopy 11/30/2022: - Preparation of the colon was fair. - Aphtha in the terminal ileum. Biopsied. - Localized moderate inflammation was found in the mid rectum and in the distal rectum, rule out ulcerative colitis. Biopsied. - The examination was otherwise normal on direct and retroflexion views. - Biopsies for surveillance were taken from the cecum, ascending colon, transverse colon, descending colon and sigmoid colon.      Pathology:   A. GASTRIC ANTRUM AND BODY, BIOPSIES: --ACTIVE HELICOBACTER GASTRITIS. Note: Immunohistochemical staining highlights numerous microorganisms morphologically consistent with Helicobacter. The H. pylori drug susceptibility assay has been ordered and the results will appear separately in an addendum report.   B. TERMINAL ILEUM, BIOPSY: --FOCAL ACTIVE ILEITIS, NO GRANULOMAS IDENTIFIED.   C. CECUM AND ASCENDING COLON, BIOPSIES: --COLONIC MUCOSA, NO SIGNIFICANT HISTOPATHOLOGICAL ABNORMALITIES.   D. TRANSVERSE COLON, COLD BIOPSY: --COLONIC MUCOSA, NO SIGNIFICANT HISTOPATHOLOGICAL ABNORMALITIES.   E. DESCENDING AND SIGMOID COLON, BIOPSIES: --COLONIC MUCOSA, NO SIGNIFICANT HISTOPATHOLOGICAL ABNORMALITIES.   F. RECTUM, COLD BIOPSY: --DIFFUSE ACTIVE CHRONIC COLITIS, NO GRANULOMAS IDENTIFIED. Note: Histopathological features suggest ulcerative proctitis. However, infection associated proctitis and/or medication effects may cause similar histopathological alterations.      EGD 12/2019 for anemia/melena: report not found, prog note from hospitalization notes 3 antral ulcers  Pathology 12/2019:   Stomach, antrum, biopsy - Chronic active gastritis with H. pylori-like organisms; see comment.  COMMENT: H. pylori-like organisms are identified on routine H&E stained sections.      Past Medical History  He has a past medical history of Personal history of other diseases of the circulatory system and Personal history of other diseases of the  "musculoskeletal system and connective tissue.    Surgical History  He has no past surgical history on file.     Social History  He reports that he has never smoked. He has never been exposed to tobacco smoke. He has never used smokeless tobacco. He reports current alcohol use of about 4.0 standard drinks of alcohol per week. He reports current drug use. Drug: Marijuana.    Family History  No family history on file.     Allergies  Patient has no known allergies.    Review of Systems  10 systems reviewed and negative other than HPI     Physical Exam  Physical Exam  Vitals reviewed.   Constitutional:       General: He is not in acute distress.     Appearance: He is underweight. He is ill-appearing. He is not toxic-appearing or diaphoretic.   HENT:      Head: Normocephalic and atraumatic.      Nose: Nose normal.      Mouth/Throat:      Mouth: Mucous membranes are moist.      Pharynx: Oropharynx is clear.   Eyes:      Conjunctiva/sclera: Conjunctivae normal.   Cardiovascular:      Rate and Rhythm: Normal rate and regular rhythm.      Pulses: Normal pulses.      Heart sounds: Normal heart sounds.   Pulmonary:      Effort: Pulmonary effort is normal.      Breath sounds: Normal breath sounds.   Abdominal:      General: Bowel sounds are normal. There is no distension.      Palpations: Abdomen is soft.      Tenderness: There is abdominal tenderness. There is no guarding or rebound.      Hernia: No hernia is present.   Musculoskeletal:      Cervical back: Neck supple.   Skin:     General: Skin is warm and dry.   Neurological:      General: No focal deficit present.      Mental Status: He is alert and oriented to person, place, and time.   Psychiatric:         Mood and Affect: Mood normal.         Behavior: Behavior normal.            Last Recorded Vitals  Blood pressure 125/90, pulse 97, temperature 36.8 °C (98.2 °F), temperature source Temporal, resp. rate 17, height 1.905 m (6' 3\"), weight 86.2 kg (190 lb), SpO2 97 " %.    Relevant Results      Scheduled medications  amLODIPine, 5 mg, oral, Daily  hydroCHLOROthiazide, 12.5 mg, oral, Daily  melatonin, 6 mg, oral, Nightly  mesalamine, 1,000 mg, rectal, Nightly  pantoprazole, 40 mg, oral, BID AC   Or  pantoprazole, 40 mg, intravenous, BID AC  piperacillin-tazobactam, 3.375 g, intravenous, q6h      Continuous medications  lactated Ringer's, 75 mL/hr, Last Rate: 75 mL/hr (04/04/24 0645)      PRN medications  PRN medications: traZODone  Electrocardiogram, 12-lead PRN ACS symptoms    Result Date: 4/4/2024  Sinus tachycardia Right atrial enlargement Minimal voltage criteria for LVH, may be normal variant ( Sokolow-Bird ) Nonspecific ST abnormality Abnormal ECG When compared with ECG of 28-MAR-2024 00:06, Vent. rate has increased BY  52 BPM ST now depressed in Inferior leads ST now depressed in Anterolateral leads T wave amplitude has increased in Anterior leads    CT abdomen pelvis w IV contrast    Result Date: 4/3/2024    1. Mild segmental wall thickening of the distal sigmoid colon and rectum again noted with multiple prominent perirectal lymph nodes measuring up to 1 cm. Findings may be secondary to a mild segmental colitis/proctitis, although difficult to exclude underlying neoplastic process. Recommend further evaluation with colonoscopy if not previously performed. 2. Stable mildly prominent lymph nodes in the right lower quadrant again noted measuring up to approximately 1.2 cm. 3. Mild hepatic steatosis. 4. Stable nonspecific sclerosis of L4 and L5 vertebral bodies and also multilevel anterior sclerosis of T9 through L3 and anterior osseous spurring. Osseous fusion of the bilateral sacroiliac joints with sclerosis and lucency, findings concerning for underlying spondyloarthropathy, and follow-up rheumatology consultation/evaluation is recommended.      Signed by Frank Olivia MD    XR chest 2 views    Result Date: 4/3/2024  Interpreted By:  Chance Pillai, STUDY: XR CHEST 2 VIEWS    INDICATION: Signs/Symptoms:Abdominal pain.   COMPARISON: None   ACCESSION NUMBER(S): [id number]   ORDERING CLINICIAN: OLIVA VILLAFUERTE   FINDINGS: No consolidation, effusion, edema, or pneumothorax. Heart size within normal limits. Suspected developing thoracic DISH.       No evidence of acute intrathoracic abnormality.   Signed by: Chance Pillai 4/3/2024 11:51 AM Dictation workstation:   [id number]    ECG 12 lead    Result Date: 3/29/2024  Normal sinus rhythm Normal ECG No previous ECGs available See ED provider note for full interpretation and clinical correlation Confirmed by Adelina Rogers ([id number]) on 3/29/2024 11:13:21 AM    CT abdomen pelvis w IV contrast    Result Date: 3/28/2024    Nonspecific prominent right lower quadrant lymph nodes which measure 1.4 cm in size. There are also prominent posterior and inferior pelvic lymph nodes at level of the sigmoid colon and rectum which measure up to 1.2 cm. The lymph nodes may be reactive in nature, such as secondary to inflammatory process of the bowel, however other underlying pathology including malignancy is not excluded and further evaluation is recommended. Colonoscopy is also recommended for evaluation to exclude underlying pathology of bowel.   Prostatomegaly; please correlate with PSA.   Nonspecific sclerosis of L4 and L5 vertebral bodies and also multilevel anterior sclerosis of T9 through L3 and anterior osseous spurring. Osseous fusion of the bilateral sacroiliac joints with sclerosis and lucency, findings concerning for underlying spondyloarthropathy, and follow-up rheumatology consultation/evaluation is recommended.       MACRO: None   Signed by: Víctor Villatoro 3/28/2024 3:48 AM Dictation workstation:   [id number]        Assessment/Plan     48 y.o. male with h/o HTN, anemia, ulcerative proctitis, peptic ulcer disease attributed to NSAID use and H. pylori, presented with c/o acute on chronic epigastric pain precipitated by meals or any fluids, no  associated vomiting, not relieved by over-the-counter PPI.  Intermittent black stools.  Has history of ulcerative proctitis, started on Canasa suppository 4 days ago.  CT showed proctitis.  Possible etiology of his symptoms include PUD, gastritis, H. pylori, could be related to worsening of proctitis.    -N.p.o.  -Recommend EGD and flexible sigmoidoscopy to perform today.  Discussed with Dr. Adams.  -Will give 2 enemas prior to the procedure.  -PPI and supportive care with antiemetics as needed  Further recommendations pending above results      I spent 45 minutes in the professional and overall care of this patient.      Cristina Valdes, APRN-CNP

## 2024-04-04 NOTE — CARE PLAN
The patient's goals for the shift include not having any episodes of severe epigastric pain.    The clinical goals for the shift include remain free from abdominal pain    Problem: Pain - Adult  Goal: Verbalizes/displays adequate comfort level or baseline comfort level  Outcome: Progressing     Problem: Safety - Adult  Goal: Free from fall injury  Outcome: Progressing     Problem: Discharge Planning  Goal: Discharge to home or other facility with appropriate resources  Outcome: Progressing     Problem: Nutrition  Goal: Less than 5 days NPO/clear liquids  Outcome: Progressing  Goal: Oral intake greater than 50%  Outcome: Progressing  Goal: Oral intake greater 75%  Outcome: Progressing  Goal: Consume prescribed supplement  Outcome: Progressing  Goal: Adequate PO fluid intake  Outcome: Progressing  Goal: Nutrition support goals are met within 48 hrs  Outcome: Progressing  Goal: Nutrition support is meeting 75% of nutrient needs  Outcome: Progressing  Goal: Tube feed tolerance  Outcome: Progressing  Goal: BG  mg/dL  Outcome: Progressing  Goal: Lab values WNL  Outcome: Progressing  Goal: Electrolytes WNL  Outcome: Progressing  Goal: Promote healing  Outcome: Progressing  Goal: Maintain stable weight  Outcome: Progressing  Goal: Reduce weight from edema/fluid  Outcome: Progressing  Goal: Gradual weight gain  Outcome: Progressing  Goal: Improve ostomy output  Outcome: Progressing     Problem: Fall/Injury  Goal: Not fall by end of shift  Outcome: Progressing  Goal: Be free from injury by end of the shift  Outcome: Progressing  Goal: Verbalize understanding of personal risk factors for fall in the hospital  Outcome: Progressing  Goal: Verbalize understanding of risk factor reduction measures to prevent injury from fall in the home  Outcome: Progressing  Goal: Use assistive devices by end of the shift  Outcome: Progressing  Goal: Pace activities to prevent fatigue by end of the shift  Outcome: Progressing     Problem:  Pain  Goal: My pain/discomfort is manageable  Outcome: Progressing     Problem: Safety  Goal: Patient will be injury free during hospitalization  Outcome: Progressing  Goal: I will remain free of falls  Outcome: Progressing     Problem: Daily Care  Goal: Daily care needs are met  Outcome: Progressing     Problem: Psychosocial Needs  Goal: Collaborate with me, my family, and caregiver to identify my specific goals  Outcome: Progressing     Problem: Discharge Barriers  Goal: My discharge needs are met  Outcome: Progressing     Problem: Pain  Goal: Takes deep breaths with improved pain control throughout the shift  Outcome: Progressing  Goal: Turns in bed with improved pain control throughout the shift  Outcome: Progressing  Goal: Walks with improved pain control throughout the shift  Outcome: Progressing  Goal: Performs ADL's with improved pain control throughout shift  Outcome: Progressing  Goal: Participates in PT with improved pain control throughout the shift  Outcome: Progressing  Goal: Free from opioid side effects throughout the shift  Outcome: Progressing  Goal: Free from acute confusion related to pain meds throughout the shift  Outcome: Progressing

## 2024-04-04 NOTE — PERIOPERATIVE NURSING NOTE
1709- PHASE II - Patient to Endoscopy bay at this time in stable condition. Beside monitors applied to patient upon arrival. Report received from GI team at bedside     1728- Called room 110 RNLacy and gave updates at this time     1736- Dr. Adams at bedside speaking with patient at this time     1739- Criteria met for patient to discharge from Phase II at this time     1757- Patient transported to floor at this time in stable condition and with all belongings via stretcher.

## 2024-04-04 NOTE — PROGRESS NOTES
04/04/24 1116   Discharge Planning   Living Arrangements Spouse/significant other   Support Systems Spouse/significant other   Assistance Needed No home going needs identified   Type of Residence Private residence   Home or Post Acute Services None   Patient expects to be discharged to: Home   Financial Resource Strain   How hard is it for you to pay for the very basics like food, housing, medical care, and heating? Not very   Housing Stability   In the last 12 months, was there a time when you were not able to pay the mortgage or rent on time? N   In the last 12 months, how many places have you lived? 1   In the last 12 months, was there a time when you did not have a steady place to sleep or slept in a shelter (including now)? N   Transportation Needs   In the past 12 months, has lack of transportation kept you from medical appointments or from getting medications? no   In the past 12 months, has lack of transportation kept you from meetings, work, or from getting things needed for daily living? No     Met with patient and wife (who is also a patient) at bedside to  discuss his preferences for care upon discharge. Discussed how patient manages health at home. Lives with his wife-works at the [employer] .  Independent in all ADLs.  No home O2, dialysis, or assistive devices.  No additional resources or needs identified. Reviewed today's plan of care.  Patient verbalized understanding as evidenced by teach back method. Patient plans to return home at discharge & follow up with his PCP.  Patient encouraged to schedule with rheumatologist as soon as possible since it might take a while to get in.  EGD and flex sig today  NEO Roberts RN [facility]

## 2024-04-05 ENCOUNTER — PATIENT OUTREACH (OUTPATIENT)
Dept: CARE COORDINATION | Facility: CLINIC | Age: 49
End: 2024-04-05
Payer: COMMERCIAL

## 2024-04-05 NOTE — PROGRESS NOTES
Discharge Facility: [facility]  Discharge Diagnosis: Proctitis  Admission Date: 4/3/2024  Discharge Date: 4/4/2024    PCP Appointment Date:  -4/16/2024 1245    Hospital Encounter and Summary: Linked     See discharge assessment below for further details    Engagement  Call Start Time: 0907 (4/5/2024  9:08 AM)    Medications  Does the patient have all medications ordered at discharge?: Not applicable (4/5/2024  9:08 AM)  Care Management Interventions: No intervention needed (4/5/2024  9:08 AM)    Appointments  Does the patient have a primary care provider?: Yes (4/5/2024  9:08 AM)  Care Management Interventions: Verified appointment date/time/provider (4/5/2024  9:08 AM)  Has the patient kept scheduled appointments due by today?: Yes (4/5/2024  9:08 AM)    Self Management  Has home health visited the patient within 72 hours of discharge?: Not applicable (4/5/2024  9:08 AM)    Patient Teaching  Does the patient have access to their discharge instructions?: Yes (4/5/2024  9:08 AM)  Care Management Interventions: Reviewed instructions with patient (4/5/2024  9:08 AM)  What is the patient's perception of their health status since discharge?: Improving (4/5/2024  9:08 AM)  Is the patient/caregiver able to teach back the hierarchy of who to call/visit for symptoms/problems? PCP, Specialist, Home Health nurse, Urgent Care, ED, 911: Yes (4/5/2024  9:08 AM)    Wrap Up  Wrap Up Additional Comments: Pt was admitted to [facility] 4/3-4/4/2024 for proctitis. Pt reports doing a little better since discharge. He says he is still trying to work up an appetite. Pt discharged with no new medication. Pt reports understanding of discharge instructions. Pt scheduled for PCP follow up 4/16/2024 1245. Pt denies any questions, needs, or concerns at this time. He is encouraged to call if questions or needs arise. (4/5/2024  9:08 AM)  Call End Time: 0911 (4/5/2024  9:08 AM)

## 2024-04-07 LAB
BACTERIA BLD CULT: NORMAL
BACTERIA BLD CULT: NORMAL

## 2024-04-12 LAB
LAB AP ASR DISCLAIMER: NORMAL
LABORATORY COMMENT REPORT: NORMAL
PATH REPORT.FINAL DX SPEC: NORMAL
PATH REPORT.GROSS SPEC: NORMAL
PATH REPORT.TOTAL CANCER: NORMAL

## 2024-04-16 ENCOUNTER — OFFICE VISIT (OUTPATIENT)
Dept: PRIMARY CARE | Facility: CLINIC | Age: 49
End: 2024-04-16
Payer: COMMERCIAL

## 2024-04-16 VITALS
HEIGHT: 75 IN | HEART RATE: 99 BPM | WEIGHT: 180 LBS | BODY MASS INDEX: 22.38 KG/M2 | OXYGEN SATURATION: 98 % | SYSTOLIC BLOOD PRESSURE: 100 MMHG | DIASTOLIC BLOOD PRESSURE: 67 MMHG | TEMPERATURE: 98 F | RESPIRATION RATE: 19 BRPM

## 2024-04-16 DIAGNOSIS — K92.1 HEMATOCHEZIA: Primary | ICD-10-CM

## 2024-04-16 DIAGNOSIS — D72.823 LEUKEMOID REACTION: ICD-10-CM

## 2024-04-16 DIAGNOSIS — R79.82 CRP ELEVATED: ICD-10-CM

## 2024-04-16 DIAGNOSIS — K59.01 SLOW TRANSIT CONSTIPATION: ICD-10-CM

## 2024-04-16 PROBLEM — S46.919A SHOULDER STRAIN: Status: ACTIVE | Noted: 2024-04-16

## 2024-04-16 PROBLEM — D50.9 IRON DEFICIENCY ANEMIA: Status: ACTIVE | Noted: 2024-04-16

## 2024-04-16 PROBLEM — K25.9 MULTIPLE GASTRIC ULCERS: Status: ACTIVE | Noted: 2019-12-30

## 2024-04-16 PROBLEM — D53.9 ANEMIA ASSOCIATED WITH NUTRITIONAL DEFICIENCY: Status: ACTIVE | Noted: 2024-04-16

## 2024-04-16 PROBLEM — D50.0 ANEMIA DUE TO BLOOD LOSS: Status: ACTIVE | Noted: 2019-12-28

## 2024-04-16 PROBLEM — E55.9 VITAMIN D DEFICIENCY: Status: ACTIVE | Noted: 2024-04-16

## 2024-04-16 PROBLEM — Z86.79 HISTORY OF HYPERTENSION: Status: ACTIVE | Noted: 2024-04-16

## 2024-04-16 PROBLEM — R01.2: Status: ACTIVE | Noted: 2024-04-16

## 2024-04-16 PROBLEM — K25.9 GASTRIC ULCER: Status: ACTIVE | Noted: 2024-04-16

## 2024-04-16 PROBLEM — R53.83 FATIGUE: Status: ACTIVE | Noted: 2024-04-16

## 2024-04-16 PROBLEM — E66.9 OBESITY: Status: ACTIVE | Noted: 2024-04-16

## 2024-04-16 PROBLEM — K11.20 SIALOADENITIS: Status: ACTIVE | Noted: 2024-04-16

## 2024-04-16 PROBLEM — U07.1 DISEASE DUE TO SEVERE ACUTE RESPIRATORY SYNDROME CORONAVIRUS 2 (SARS-COV-2): Status: ACTIVE | Noted: 2024-04-16

## 2024-04-16 PROBLEM — B96.81 HELICOBACTER PYLORI GASTRITIS: Status: ACTIVE | Noted: 2024-02-08

## 2024-04-16 PROBLEM — U07.1 COVID-19 VIRUS INFECTION: Status: ACTIVE | Noted: 2024-04-16

## 2024-04-16 PROBLEM — I10 BENIGN ESSENTIAL HYPERTENSION: Status: ACTIVE | Noted: 2023-11-30

## 2024-04-16 PROBLEM — K29.70 HELICOBACTER PYLORI GASTRITIS: Status: ACTIVE | Noted: 2024-02-08

## 2024-04-16 PROBLEM — S86.911A: Status: ACTIVE | Noted: 2024-04-16

## 2024-04-16 PROBLEM — R59.0 INTRA-ABDOMINAL LYMPHADENOPATHY: Status: ACTIVE | Noted: 2024-04-16

## 2024-04-16 PROBLEM — K51.90 ULCERATIVE COLITIS (MULTI): Status: ACTIVE | Noted: 2024-02-08

## 2024-04-16 PROBLEM — K59.00 CONSTIPATED: Status: ACTIVE | Noted: 2019-12-29

## 2024-04-16 PROBLEM — M25.519 SHOULDER JOINT PAIN: Status: ACTIVE | Noted: 2024-04-16

## 2024-04-16 PROBLEM — M54.16 LUMBAR RADICULOPATHY: Status: ACTIVE | Noted: 2024-04-16

## 2024-04-16 PROBLEM — M79.18 MUSCULOSKELETAL PAIN: Status: ACTIVE | Noted: 2019-12-28

## 2024-04-16 PROBLEM — L40.9 PSORIASIS: Status: ACTIVE | Noted: 2024-04-16

## 2024-04-16 PROBLEM — R03.0 ELEVATED BLOOD PRESSURE READING WITHOUT DIAGNOSIS OF HYPERTENSION: Status: ACTIVE | Noted: 2024-04-16

## 2024-04-16 PROBLEM — M23.51 RECURRENT RIGHT KNEE INSTABILITY: Status: ACTIVE | Noted: 2024-04-16

## 2024-04-16 PROBLEM — M54.2 CERVICALGIA: Status: ACTIVE | Noted: 2024-04-16

## 2024-04-16 PROBLEM — S39.012A BACK STRAIN: Status: ACTIVE | Noted: 2024-04-16

## 2024-04-16 PROCEDURE — 3078F DIAST BP <80 MM HG: CPT | Performed by: INTERNAL MEDICINE

## 2024-04-16 PROCEDURE — 99212 OFFICE O/P EST SF 10 MIN: CPT | Performed by: INTERNAL MEDICINE

## 2024-04-16 PROCEDURE — 3074F SYST BP LT 130 MM HG: CPT | Performed by: INTERNAL MEDICINE

## 2024-04-16 ASSESSMENT — ENCOUNTER SYMPTOMS
SORE THROAT: 0
CONSTIPATION: 1
DIARRHEA: 1
PALPITATIONS: 0
SHORTNESS OF BREATH: 0
AGITATION: 0
CHEST TIGHTNESS: 0
ABDOMINAL DISTENTION: 1
CHILLS: 0
SINUS PRESSURE: 0
LOSS OF SENSATION IN FEET: 0
NAUSEA: 0
DEPRESSION: 0
MYALGIAS: 0
BLOOD IN STOOL: 1
ACTIVITY CHANGE: 0
OCCASIONAL FEELINGS OF UNSTEADINESS: 0
WEAKNESS: 0

## 2024-04-16 ASSESSMENT — PATIENT HEALTH QUESTIONNAIRE - PHQ9
1. LITTLE INTEREST OR PLEASURE IN DOING THINGS: NOT AT ALL
2. FEELING DOWN, DEPRESSED OR HOPELESS: NOT AT ALL
SUM OF ALL RESPONSES TO PHQ9 QUESTIONS 1 AND 2: 0

## 2024-04-16 ASSESSMENT — PAIN SCALES - GENERAL: PAINLEVEL: 3

## 2024-04-16 NOTE — LETTER
April 17, 2024     Patient: Tha Leon Jr.   YOB: 1975   Date of Visit: 4/16/2024       To Whom It May Concern:    Tha Leon was seen in my clinic on 4/16/2024 at 12:45 pm. Please excuse Tha for his absence from 4/06/2024 to return with no restriction on 4/20/2024.    If you have any questions or concerns, please don't hesitate to call.         Sincerely,         Billy Quesada MD        CC: No Recipients

## 2024-04-16 NOTE — PROGRESS NOTES
"Subjective   Patient ID: Tha Leon Jr. is a 48 y.o. male who presents for HOSPITAL DICHARGE FOLLOW UP.  He was in the hospital from 4/4/24 to 4/5/24 with a GI bleed.  He had not been eating and when he ate it sat in one spot.  He is eating a little better.  Still passing dark stool.  His CRP and Sed rates are high.  He reports about 20 pounds of weight loss over the last several weeks.    HPI     Review of Systems   Constitutional:  Negative for activity change and chills.   HENT:  Negative for congestion, sinus pressure and sore throat.    Respiratory:  Negative for chest tightness and shortness of breath.    Cardiovascular:  Negative for chest pain and palpitations.   Gastrointestinal:  Positive for abdominal distention, blood in stool, constipation and diarrhea. Negative for nausea.   Musculoskeletal:  Negative for myalgias.   Neurological:  Negative for weakness.   Psychiatric/Behavioral:  Negative for agitation and behavioral problems.        Objective   /67   Pulse 99   Temp 36.7 °C (98 °F)   Resp 19   Ht 1.905 m (6' 3\")   Wt 81.6 kg (180 lb)   SpO2 98%   BMI 22.50 kg/m²     Physical Exam  Constitutional:       Appearance: Normal appearance.   HENT:      Head: Normocephalic and atraumatic.      Nose: Nose normal.      Mouth/Throat:      Mouth: Mucous membranes are moist.   Eyes:      Extraocular Movements: Extraocular movements intact.      Pupils: Pupils are equal, round, and reactive to light.   Cardiovascular:      Rate and Rhythm: Normal rate and regular rhythm.   Pulmonary:      Effort: No respiratory distress.      Breath sounds: No wheezing.   Abdominal:      General: Bowel sounds are normal.      Palpations: Abdomen is soft.   Neurological:      General: No focal deficit present.         Assessment/Plan   Problem List Items Addressed This Visit             ICD-10-CM    Constipated K59.00    Hematochezia - Primary K92.1     Other Visit Diagnoses         Codes    Leukemoid reaction     " D72.823    Relevant Orders    CBC    CRP elevated     R79.82    Relevant Orders    C-reactive protein          We talked about he and his wife's weight loss as a sign of possible environmental exposures at their home.  He is unaware of any new environment changes, but will continue to think about it.  He will follow-up with the tests ordered by the gastroenterologist.  We will recheck his blood count to make sure it is going in the right direction and see if his C-reactive protein is coming down.

## 2024-04-18 ENCOUNTER — PATIENT OUTREACH (OUTPATIENT)
Dept: CARE COORDINATION | Facility: CLINIC | Age: 49
End: 2024-04-18
Payer: COMMERCIAL

## 2024-04-23 ENCOUNTER — DOCUMENTATION (OUTPATIENT)
Dept: GASTROENTEROLOGY | Facility: CLINIC | Age: 49
End: 2024-04-23

## 2024-04-23 NOTE — PROGRESS NOTES
Called patient with results of biopsies done during inpatient admission. No answer so LMOM. Also sent [place] message. Patient needs follow-up with primary gastroenterolgist

## 2024-05-02 ENCOUNTER — LAB (OUTPATIENT)
Dept: LAB | Facility: LAB | Age: 49
End: 2024-05-02
Payer: COMMERCIAL

## 2024-05-02 DIAGNOSIS — Z00.00 HEALTHCARE MAINTENANCE: ICD-10-CM

## 2024-05-02 DIAGNOSIS — K51.219 ULCERATIVE PROCTITIS WITH COMPLICATION (MULTI): ICD-10-CM

## 2024-05-02 DIAGNOSIS — B96.81 HELICOBACTER PYLORI GASTRITIS: ICD-10-CM

## 2024-05-02 DIAGNOSIS — D51.9 ANEMIA DUE TO VITAMIN B12 DEFICIENCY, UNSPECIFIED B12 DEFICIENCY TYPE: ICD-10-CM

## 2024-05-02 DIAGNOSIS — K29.70 HELICOBACTER PYLORI GASTRITIS: ICD-10-CM

## 2024-05-02 LAB
CRP SERPL-MCNC: 7.07 MG/DL
ERYTHROCYTE [DISTWIDTH] IN BLOOD BY AUTOMATED COUNT: 14.6 % (ref 11.5–14.5)
FERRITIN SERPL-MCNC: 304 NG/ML (ref 20–300)
HBV SURFACE AB SER-ACNC: <3.1 MIU/ML
HCT VFR BLD AUTO: 34 % (ref 41–52)
HGB BLD-MCNC: 10.7 G/DL (ref 13.5–17.5)
IRON SATN MFR SERPL: 15 % (ref 25–45)
IRON SERPL-MCNC: 38 UG/DL (ref 35–150)
MCH RBC QN AUTO: 27.8 PG (ref 26–34)
MCHC RBC AUTO-ENTMCNC: 31.5 G/DL (ref 32–36)
MCV RBC AUTO: 88 FL (ref 80–100)
NRBC BLD-RTO: 0 /100 WBCS (ref 0–0)
PLATELET # BLD AUTO: 549 X10*3/UL (ref 150–450)
RBC # BLD AUTO: 3.85 X10*6/UL (ref 4.5–5.9)
TIBC SERPL-MCNC: 246 UG/DL (ref 240–445)
UIBC SERPL-MCNC: 208 UG/DL (ref 110–370)
VIT B12 SERPL-MCNC: 542 PG/ML (ref 211–911)
WBC # BLD AUTO: 12.3 X10*3/UL (ref 4.4–11.3)

## 2024-05-02 PROCEDURE — 83013 H PYLORI (C-13) BREATH: CPT

## 2024-05-02 PROCEDURE — 86706 HEP B SURFACE ANTIBODY: CPT

## 2024-05-02 PROCEDURE — 82607 VITAMIN B-12: CPT

## 2024-05-02 PROCEDURE — 36415 COLL VENOUS BLD VENIPUNCTURE: CPT

## 2024-05-02 PROCEDURE — 83540 ASSAY OF IRON: CPT

## 2024-05-02 PROCEDURE — 82728 ASSAY OF FERRITIN: CPT

## 2024-05-02 PROCEDURE — 86140 C-REACTIVE PROTEIN: CPT

## 2024-05-02 PROCEDURE — 85027 COMPLETE CBC AUTOMATED: CPT

## 2024-05-02 PROCEDURE — 83550 IRON BINDING TEST: CPT

## 2024-05-03 LAB — UREA BREATH TEST QL: NEGATIVE

## 2024-05-08 NOTE — PROGRESS NOTES
Subjective   Patient ID: Tha Leon Jr. is a 48 y.o. male who presents for Arthritis (NPV to establish care. C/o constant back pain for several years. No relief.).    HPI  [employer] sh[employer] hts    Consult  Referred by Opal Navarro [facility] hospitalist  In Hosp  April for GI sxs  Flex sigmoid DX Ulcerative Colitis  Suggested Primary Gastroenterologist in past   CT abdomen Fusion NERY SI Joints  Sclerosis vertebral bodies     Back pain for years 20 yrs  PCP say DDD  Naprosyn helps but PUD   Ulcerative colitis Dr. Abdullahi   On steroid suppositories off 3 weeks and off med   Bowels are ok now no sxs    H pylori in past   Psoriatic arthritis since a child still with mild disease  Took MTX and light tx years ago     AM Gel 15 minutes     BONES: from ct abd and Pelvis 2024  No acute fracture or aggressive osseous lesion.Multilevel degenerative  change of the lumbar spine. There is nonspecific sclerosis of L4 and  L5 vertebral bodies and multilevel sclerosis of T9 through L3  vertebral bodies anteriorly and multilevel anterior osseous spurring.  There is fusion of the bilateral sacroiliac joints and abnormal  sclerosis and lucency. Nonspecific sclerosis of the pubic symphysis.    IMPRESSION:  1. Mild segmental wall thickening of the distal sigmoid colon and  rectum again noted with multiple prominent perirectal lymph nodes  measuring up to 1 cm. Findings may be secondary to a mild segmental  colitis/proctitis, although difficult to exclude underlying neoplastic  process. Recommend further evaluation with colonoscopy if not  previously performed.  2. Stable mildly prominent lymph nodes in the right lower quadrant  again noted measuring up to approximately 1.2 cm.  3. Mild hepatic steatosis.  4. Stable nonspecific sclerosis of L4 and L5 vertebral bodies and also  multilevel anterior sclerosis of T9 through L3 and anterior osseous  spurring. Osseous fusion of the bilateral sacroiliac joints with  sclerosis and lucency, findings  concerning for underlying  spondyloarthropathy, and follow-up rheumatology  consultation/evaluation is recommended.        ROS      Current Outpatient Medications   Medication Sig Dispense Refill    amLODIPine (Norvasc) 5 mg tablet Take 1 tablet (5 mg) by mouth once daily. for blood pressure 90 tablet 3    cholecalciferol (Vitamin D3) 25 MCG (1000 UT) tablet Take 1 tablet (1,000 Units) by mouth once daily.      hydroCHLOROthiazide (HYDRODiuril) 12.5 mg tablet Take 1 tablet (12.5 mg) by mouth once daily. 90 tablet 3    omeprazole (PriLOSEC) 20 mg DR capsule Take 1 capsule (20 mg) by mouth twice a day.      ferrous sulfate 325 (65 Fe) MG EC tablet Take 65 mg by mouth once daily with breakfast. Do not crush, chew, or split.      mesalamine (Canasa) 1,000 mg suppository Insert 1 suppository (1,000 mg) into the rectum once daily at bedtime. 30 suppository 0     No current facility-administered medications for this visit.         has a past medical history of Personal history of other diseases of the circulatory system and Personal history of other diseases of the musculoskeletal system and connective tissue.   No past surgical history on file.   Social History     Tobacco Use    Smoking status: Never     Passive exposure: Never    Smokeless tobacco: Never   Vaping Use    Vaping status: Never Used   Substance Use Topics    Alcohol use: Yes     Alcohol/week: 4.0 standard drinks of alcohol     Types: 4 Shots of liquor per week     Comment: sparingly    Drug use: Yes     Types: Marijuana      family history is not on file.  Pain Management Panel           No data to display                 Vitals:    05/10/24 1001   BP: 112/67   Pulse: 90     Lab Results   Component Value Date    SEDRATE 110 (H) 04/03/2024    CRP 7.07 (H) 05/02/2024       PHYSICAL EXAM      NODES all peripheral stations negative  HEART RRR no murmur  LUNGS all fields clear but diminished breath sounds  ABDOMEN no hepatosplenomegaly  VASCULAR 2+ equal  NEURO  no proximal or distal muscle weakness  SKIN there are some areas of psoriatic arthritis at the lower extremity on both lower legs  JOINTS there is a positive Schober's test  No evidence of peripheral active synovitis of any small or large joint of the upper or lower extremity    PLAN  Diagnoses and all orders for this visit:  Ankylosing spondylitis of multiple sites in spine (Multi)  Psoriatic arthritis (Multi)  -     Referral to Rheumatology  Bilateral sacroiliitis (CMS-HCC)  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    In the distant past he has gotten benefit from naproxen but this gave him gastric ulcers  Tylenol does not help    His physical examination is positive for shoulders  Is CT of the abdomen and pelvis strongly suggest bilateral sacroiliac disease with fusion as well as spinal disease.      His psoriasis has not been treated with a biologic in the past.  He uses over-the-counter hydrocortisone for his lower leg inflammation    Ulcerative colitis over the past 2 years with intermittent flareups treated with rectal steroid suppositories .    His CBC CMP are normal platelet count and CRP from May 2, 2024 are significantly elevated supporting ongoing inflammation    Plan:    A decision needs to be made by Dr. Estrada from gastroenterology.  If she will be starting a biologic that would help both the bowel and hopefully axial skeleton inflammation from his ankylosing spondylitis    If there are no plans for gastroenterology interventions with the Biologics I will see patient back to discuss instituting a biologic treatment.

## 2024-05-10 ENCOUNTER — OFFICE VISIT (OUTPATIENT)
Dept: RHEUMATOLOGY | Facility: CLINIC | Age: 49
End: 2024-05-10
Payer: COMMERCIAL

## 2024-05-10 VITALS
SYSTOLIC BLOOD PRESSURE: 112 MMHG | BODY MASS INDEX: 23 KG/M2 | DIASTOLIC BLOOD PRESSURE: 67 MMHG | HEART RATE: 90 BPM | WEIGHT: 184 LBS

## 2024-05-10 DIAGNOSIS — D50.0 IRON DEFICIENCY ANEMIA DUE TO CHRONIC BLOOD LOSS: ICD-10-CM

## 2024-05-10 DIAGNOSIS — M45.0 ANKYLOSING SPONDYLITIS OF MULTIPLE SITES IN SPINE (MULTI): Primary | ICD-10-CM

## 2024-05-10 DIAGNOSIS — M46.1 BILATERAL SACROILIITIS (CMS-HCC): ICD-10-CM

## 2024-05-10 DIAGNOSIS — K51.30 ULCERATIVE RECTOSIGMOIDITIS WITHOUT COMPLICATION (MULTI): ICD-10-CM

## 2024-05-10 DIAGNOSIS — L40.50 PSORIATIC ARTHRITIS (MULTI): ICD-10-CM

## 2024-05-10 PROCEDURE — 99205 OFFICE O/P NEW HI 60 MIN: CPT | Performed by: INTERNAL MEDICINE

## 2024-05-10 PROCEDURE — 1036F TOBACCO NON-USER: CPT | Performed by: INTERNAL MEDICINE

## 2024-05-10 PROCEDURE — 3074F SYST BP LT 130 MM HG: CPT | Performed by: INTERNAL MEDICINE

## 2024-05-10 PROCEDURE — 3078F DIAST BP <80 MM HG: CPT | Performed by: INTERNAL MEDICINE

## 2024-05-10 NOTE — LETTER
May 10, 2024     VASHTI Keyes    Patient: Tha Leon Jr.   YOB: 1975   Date of Visit: 5/10/2024       Dear BRANNON Jordan-CNP:    Thank you for referring Tha Leon to me for evaluation. Below are my notes for this consultation.  If you have questions, please do not hesitate to call me. I look forward to following your patient along with you.       Sincerely,     Jerrod Doyle, DO      CC: No Recipients  ______________________________________________________________________________________    Subjective  Patient ID: Tha Leon Jr. is a 48 y.o. male who presents for Arthritis (NPV to establish care. C/o constant back pain for several years. No relief.).    HPI  [employer] office shaker hts    Consult  Referred by Opal Navarro [facility] hospitalist  In Hosp  April for GI sxs  Flex sigmoid DX Ulcerative Colitis  Suggested Primary Gastroenterologist in past   CT abdomen Fusion NERY SI Joints  Sclerosis vertebral bodies     Back pain for years 20 yrs  PCP say DDD  Naprosyn helps but PUD   Ulcerative colitis Dr. Abdullahi   On steroid suppositories off 3 weeks and off med   Bowels are ok now no sxs    H pylori in past   Psoriatic arthritis since a child still with mild disease  Took MTX and light tx years ago     AM Gel 15 minutes     BONES: from ct abd and Pelvis 2024  No acute fracture or aggressive osseous lesion.Multilevel degenerative  change of the lumbar spine. There is nonspecific sclerosis of L4 and  L5 vertebral bodies and multilevel sclerosis of T9 through L3  vertebral bodies anteriorly and multilevel anterior osseous spurring.  There is fusion of the bilateral sacroiliac joints and abnormal  sclerosis and lucency. Nonspecific sclerosis of the pubic symphysis.    IMPRESSION:  1. Mild segmental wall thickening of the distal sigmoid colon and  rectum again noted with multiple prominent perirectal lymph nodes  measuring up to 1 cm. Findings may be secondary to a mild  segmental  colitis/proctitis, although difficult to exclude underlying neoplastic  process. Recommend further evaluation with colonoscopy if not  previously performed.  2. Stable mildly prominent lymph nodes in the right lower quadrant  again noted measuring up to approximately 1.2 cm.  3. Mild hepatic steatosis.  4. Stable nonspecific sclerosis of L4 and L5 vertebral bodies and also  multilevel anterior sclerosis of T9 through L3 and anterior osseous  spurring. Osseous fusion of the bilateral sacroiliac joints with  sclerosis and lucency, findings concerning for underlying  spondyloarthropathy, and follow-up rheumatology  consultation/evaluation is recommended.        ROS      Current Outpatient Medications   Medication Sig Dispense Refill   • amLODIPine (Norvasc) 5 mg tablet Take 1 tablet (5 mg) by mouth once daily. for blood pressure 90 tablet 3   • cholecalciferol (Vitamin D3) 25 MCG (1000 UT) tablet Take 1 tablet (1,000 Units) by mouth once daily.     • hydroCHLOROthiazide (HYDRODiuril) 12.5 mg tablet Take 1 tablet (12.5 mg) by mouth once daily. 90 tablet 3   • omeprazole (PriLOSEC) 20 mg DR capsule Take 1 capsule (20 mg) by mouth twice a day.     • ferrous sulfate 325 (65 Fe) MG EC tablet Take 65 mg by mouth once daily with breakfast. Do not crush, chew, or split.     • mesalamine (Canasa) 1,000 mg suppository Insert 1 suppository (1,000 mg) into the rectum once daily at bedtime. 30 suppository 0     No current facility-administered medications for this visit.         has a past medical history of Personal history of other diseases of the circulatory system and Personal history of other diseases of the musculoskeletal system and connective tissue.   No past surgical history on file.   Social History     Tobacco Use   • Smoking status: Never     Passive exposure: Never   • Smokeless tobacco: Never   Vaping Use   • Vaping status: Never Used   Substance Use Topics   • Alcohol use: Yes     Alcohol/week: 4.0  standard drinks of alcohol     Types: 4 Shots of liquor per week     Comment: sparingly   • Drug use: Yes     Types: Marijuana      family history is not on file.  Pain Management Panel           No data to display                 Vitals:    05/10/24 1001   BP: 112/67   Pulse: 90     Lab Results   Component Value Date    SEDRATE 110 (H) 04/03/2024    CRP 7.07 (H) 05/02/2024       PHYSICAL EXAM      NODES all peripheral stations negative  HEART RRR no murmur  LUNGS all fields clear but diminished breath sounds  ABDOMEN no hepatosplenomegaly  VASCULAR 2+ equal  NEURO no proximal or distal muscle weakness  SKIN there are some areas of psoriatic arthritis at the lower extremity on both lower legs  JOINTS there is a positive Schober's test  No evidence of peripheral active synovitis of any small or large joint of the upper or lower extremity    PLAN  Diagnoses and all orders for this visit:  Ankylosing spondylitis of multiple sites in spine (Multi)  Psoriatic arthritis (Multi)  -     Referral to Rheumatology  Bilateral sacroiliitis (CMS-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    In the distant past he has gotten benefit from naproxen but this gave him gastric ulcers  Tylenol does not help    His physical examination is positive for shoulders  Is CT of the abdomen and pelvis strongly suggest bilateral sacroiliac disease with fusion as well as spinal disease.      His psoriasis has not been treated with a biologic in the past.  He uses over-the-counter hydrocortisone for his lower leg inflammation    Ulcerative colitis over the past 2 years with intermittent flareups treated with rectal steroid suppositories .    His CBC CMP are normal platelet count and CRP from May 2, 2024 are significantly elevated supporting ongoing inflammation    Plan:    A  decision needs to be made by Dr. Estrada from gastroenterology.  If she will be starting a biologic that would help both the bowel and hopefully axial skeleton inflammation from his ankylosing spondylitis    If there are no plans for gastroenterology interventions with the Biologics I will see patient back to discuss instituting a biologic treatment.

## 2024-05-21 ENCOUNTER — PATIENT OUTREACH (OUTPATIENT)
Dept: CARE COORDINATION | Facility: CLINIC | Age: 49
End: 2024-05-21
Payer: COMMERCIAL

## 2024-06-17 ENCOUNTER — APPOINTMENT (OUTPATIENT)
Dept: PRIMARY CARE | Facility: HOSPITAL | Age: 49
End: 2024-06-17
Payer: COMMERCIAL

## 2024-06-17 ENCOUNTER — LAB (OUTPATIENT)
Dept: LAB | Facility: LAB | Age: 49
End: 2024-06-17
Payer: COMMERCIAL

## 2024-06-17 VITALS
HEART RATE: 96 BPM | TEMPERATURE: 98.5 F | HEIGHT: 75 IN | BODY MASS INDEX: 22.13 KG/M2 | RESPIRATION RATE: 18 BRPM | SYSTOLIC BLOOD PRESSURE: 109 MMHG | DIASTOLIC BLOOD PRESSURE: 73 MMHG | WEIGHT: 178 LBS | OXYGEN SATURATION: 97 %

## 2024-06-17 DIAGNOSIS — D50.8 OTHER IRON DEFICIENCY ANEMIA: Primary | ICD-10-CM

## 2024-06-17 DIAGNOSIS — E55.9 VITAMIN D DEFICIENCY: ICD-10-CM

## 2024-06-17 DIAGNOSIS — E83.52 HYPERCALCEMIA: ICD-10-CM

## 2024-06-17 DIAGNOSIS — I10 BENIGN ESSENTIAL HYPERTENSION: ICD-10-CM

## 2024-06-17 DIAGNOSIS — K51.30 ULCERATIVE RECTOSIGMOIDITIS WITHOUT COMPLICATION (MULTI): ICD-10-CM

## 2024-06-17 DIAGNOSIS — D72.829 LEUKOCYTOSIS, UNSPECIFIED TYPE: ICD-10-CM

## 2024-06-17 DIAGNOSIS — D50.8 OTHER IRON DEFICIENCY ANEMIA: ICD-10-CM

## 2024-06-17 PROBLEM — R03.0 ELEVATED BLOOD PRESSURE READING WITHOUT DIAGNOSIS OF HYPERTENSION: Status: RESOLVED | Noted: 2024-04-16 | Resolved: 2024-06-17

## 2024-06-17 PROBLEM — Z86.79 HISTORY OF HYPERTENSION: Status: RESOLVED | Noted: 2024-04-16 | Resolved: 2024-06-17

## 2024-06-17 LAB
25(OH)D3 SERPL-MCNC: 39 NG/ML (ref 30–100)
ALBUMIN SERPL BCP-MCNC: 3.7 G/DL (ref 3.4–5)
ALP SERPL-CCNC: 89 U/L (ref 33–120)
ALT SERPL W P-5'-P-CCNC: 6 U/L (ref 10–52)
ANION GAP SERPL CALC-SCNC: 12 MMOL/L (ref 10–20)
AST SERPL W P-5'-P-CCNC: 16 U/L (ref 9–39)
BILIRUB SERPL-MCNC: 0.3 MG/DL (ref 0–1.2)
BUN SERPL-MCNC: 16 MG/DL (ref 6–23)
CALCIUM SERPL-MCNC: 9.4 MG/DL (ref 8.6–10.3)
CHLORIDE SERPL-SCNC: 97 MMOL/L (ref 98–107)
CO2 SERPL-SCNC: 30 MMOL/L (ref 21–32)
CREAT SERPL-MCNC: 0.98 MG/DL (ref 0.5–1.3)
CRP SERPL-MCNC: 7.37 MG/DL
EGFRCR SERPLBLD CKD-EPI 2021: >90 ML/MIN/1.73M*2
ERYTHROCYTE [DISTWIDTH] IN BLOOD BY AUTOMATED COUNT: 14.2 % (ref 11.5–14.5)
GLUCOSE SERPL-MCNC: 87 MG/DL (ref 74–99)
HCT VFR BLD AUTO: 31 % (ref 41–52)
HGB BLD-MCNC: 10.2 G/DL (ref 13.5–17.5)
MCH RBC QN AUTO: 27.9 PG (ref 26–34)
MCHC RBC AUTO-ENTMCNC: 32.9 G/DL (ref 32–36)
MCV RBC AUTO: 85 FL (ref 80–100)
NRBC BLD-RTO: 0 /100 WBCS (ref 0–0)
PLATELET # BLD AUTO: 416 X10*3/UL (ref 150–450)
POTASSIUM SERPL-SCNC: 4.4 MMOL/L (ref 3.5–5.3)
PROT SERPL-MCNC: 8.3 G/DL (ref 6.4–8.2)
RBC # BLD AUTO: 3.66 X10*6/UL (ref 4.5–5.9)
SODIUM SERPL-SCNC: 135 MMOL/L (ref 136–145)
WBC # BLD AUTO: 10.7 X10*3/UL (ref 4.4–11.3)

## 2024-06-17 PROCEDURE — 82306 VITAMIN D 25 HYDROXY: CPT

## 2024-06-17 PROCEDURE — 99214 OFFICE O/P EST MOD 30 MIN: CPT | Performed by: INTERNAL MEDICINE

## 2024-06-17 PROCEDURE — 1036F TOBACCO NON-USER: CPT | Performed by: INTERNAL MEDICINE

## 2024-06-17 PROCEDURE — 86140 C-REACTIVE PROTEIN: CPT

## 2024-06-17 PROCEDURE — 85027 COMPLETE CBC AUTOMATED: CPT

## 2024-06-17 PROCEDURE — 3078F DIAST BP <80 MM HG: CPT | Performed by: INTERNAL MEDICINE

## 2024-06-17 PROCEDURE — 80053 COMPREHEN METABOLIC PANEL: CPT

## 2024-06-17 PROCEDURE — 3074F SYST BP LT 130 MM HG: CPT | Performed by: INTERNAL MEDICINE

## 2024-06-17 RX ORDER — TALC
6 POWDER (GRAM) TOPICAL
COMMUNITY
Start: 2024-04-03

## 2024-06-17 ASSESSMENT — PAIN SCALES - GENERAL: PAINLEVEL: 0-NO PAIN

## 2024-06-17 ASSESSMENT — ENCOUNTER SYMPTOMS
DEPRESSION: 0
CHEST TIGHTNESS: 0
WEAKNESS: 0
LOSS OF SENSATION IN FEET: 0
MYALGIAS: 0
AGITATION: 0
OCCASIONAL FEELINGS OF UNSTEADINESS: 0
SORE THROAT: 0
NAUSEA: 0
DIARRHEA: 0
PALPITATIONS: 0
CHILLS: 0
SINUS PRESSURE: 0
SHORTNESS OF BREATH: 0
ACTIVITY CHANGE: 0

## 2024-06-17 ASSESSMENT — PATIENT HEALTH QUESTIONNAIRE - PHQ9
SUM OF ALL RESPONSES TO PHQ9 QUESTIONS 1 AND 2: 0
2. FEELING DOWN, DEPRESSED OR HOPELESS: NOT AT ALL
1. LITTLE INTEREST OR PLEASURE IN DOING THINGS: NOT AT ALL

## 2024-06-17 NOTE — PROGRESS NOTES
"Subjective   Patient ID: Tha Leon Jr. is a 48 y.o. male who presents for Follow-up. He has not been taking iron because it makes him feel weird.  We reviewed foods high in iron.  Denies blood in stool.     HPI     Review of Systems   Constitutional:  Negative for activity change and chills.   HENT:  Negative for congestion, sinus pressure and sore throat.    Respiratory:  Negative for chest tightness and shortness of breath.    Cardiovascular:  Negative for chest pain and palpitations.   Gastrointestinal:  Negative for diarrhea and nausea.   Musculoskeletal:  Negative for myalgias.   Neurological:  Negative for weakness.   Psychiatric/Behavioral:  Negative for agitation and behavioral problems.        Objective   /73   Pulse 96   Temp 36.9 °C (98.5 °F)   Resp 18   Ht 1.905 m (6' 3\")   Wt 80.7 kg (178 lb)   SpO2 97%   BMI 22.25 kg/m²     Physical Exam  Constitutional:       Appearance: Normal appearance.   HENT:      Head: Normocephalic and atraumatic.      Nose: Nose normal.      Mouth/Throat:      Mouth: Mucous membranes are moist.   Eyes:      Extraocular Movements: Extraocular movements intact.      Pupils: Pupils are equal, round, and reactive to light.   Cardiovascular:      Rate and Rhythm: Normal rate and regular rhythm.   Pulmonary:      Effort: No respiratory distress.      Breath sounds: No wheezing.   Abdominal:      General: Bowel sounds are normal.      Palpations: Abdomen is soft.   Neurological:      General: No focal deficit present.       Assessment/Plan   Problem List Items Addressed This Visit             ICD-10-CM    Iron deficiency anemia - Primary D50.9    Relevant Orders    CBC    Ulcerative colitis (Multi) K51.90    Relevant Orders    CBC    Comprehensive Metabolic Panel    Vitamin D deficiency E55.9    Relevant Orders    Vitamin D 25-Hydroxy,Total (for eval of Vitamin D levels)    Leukocytosis D72.829    Relevant Orders    CBC    Hypercalcemia E83.52    Relevant Orders    " Comprehensive Metabolic Panel   He reports having trouble tolerating iron has not been able to take it for his mild anemia.  Thankfully he has not had any blood in his stool related to the colitis.  We will check his labs to see if they have normalized.

## 2024-06-27 ENCOUNTER — PATIENT OUTREACH (OUTPATIENT)
Dept: CARE COORDINATION | Facility: CLINIC | Age: 49
End: 2024-06-27
Payer: COMMERCIAL

## 2024-07-29 ENCOUNTER — OFFICE VISIT (OUTPATIENT)
Dept: GASTROENTEROLOGY | Facility: CLINIC | Age: 49
End: 2024-07-29
Payer: COMMERCIAL

## 2024-07-29 VITALS
TEMPERATURE: 97.1 F | BODY MASS INDEX: 20.42 KG/M2 | HEART RATE: 97 BPM | SYSTOLIC BLOOD PRESSURE: 114 MMHG | HEIGHT: 75 IN | WEIGHT: 164.2 LBS | DIASTOLIC BLOOD PRESSURE: 70 MMHG

## 2024-07-29 DIAGNOSIS — D50.9 IRON DEFICIENCY ANEMIA, UNSPECIFIED IRON DEFICIENCY ANEMIA TYPE: ICD-10-CM

## 2024-07-29 DIAGNOSIS — K51.219 ULCERATIVE PROCTITIS WITH COMPLICATION (MULTI): ICD-10-CM

## 2024-07-29 DIAGNOSIS — K51.30 ULCERATIVE RECTOSIGMOIDITIS WITHOUT COMPLICATION (MULTI): Primary | ICD-10-CM

## 2024-07-29 PROCEDURE — 3078F DIAST BP <80 MM HG: CPT | Performed by: STUDENT IN AN ORGANIZED HEALTH CARE EDUCATION/TRAINING PROGRAM

## 2024-07-29 PROCEDURE — 3008F BODY MASS INDEX DOCD: CPT | Performed by: STUDENT IN AN ORGANIZED HEALTH CARE EDUCATION/TRAINING PROGRAM

## 2024-07-29 PROCEDURE — 99214 OFFICE O/P EST MOD 30 MIN: CPT | Performed by: STUDENT IN AN ORGANIZED HEALTH CARE EDUCATION/TRAINING PROGRAM

## 2024-07-29 PROCEDURE — 3074F SYST BP LT 130 MM HG: CPT | Performed by: STUDENT IN AN ORGANIZED HEALTH CARE EDUCATION/TRAINING PROGRAM

## 2024-07-29 RX ORDER — FERROUS SULFATE 325(65) MG
325 TABLET, DELAYED RELEASE (ENTERIC COATED) ORAL 2 TIMES DAILY
Qty: 60 TABLET | Refills: 3 | Status: SHIPPED | OUTPATIENT
Start: 2024-07-29 | End: 2024-11-26

## 2024-07-29 RX ORDER — MESALAMINE 1000 MG/1
1000 SUPPOSITORY RECTAL NIGHTLY
Qty: 30 SUPPOSITORY | Refills: 3 | Status: SHIPPED | OUTPATIENT
Start: 2024-07-29 | End: 2024-11-26

## 2024-07-29 RX ORDER — MESALAMINE 0.38 G/1
1.5 CAPSULE, EXTENDED RELEASE ORAL DAILY
Qty: 120 CAPSULE | Refills: 3 | Status: SHIPPED | OUTPATIENT
Start: 2024-07-29 | End: 2024-11-26

## 2024-07-29 ASSESSMENT — PAIN SCALES - GENERAL: PAINLEVEL: 0-NO PAIN

## 2024-07-29 NOTE — PATIENT INSTRUCTIONS
Thank you for seeing us in clinic today!      In summary, please do the following:   Please get your bloodwork and stool studies in 3 months (10/2024).   Take your iron pill twice a day. If you remain anemic despite iron intake, we will plan on doing further testing with a pill camera.   3. Please start your Canasa suppositories every night. I will add a mesalamine oral pill to take daily. If this does not work, we will plan on starting an infusion or injection therapy.      We will see you again in 2 months or sooner if needed.   If you have any questions or concerns, please call the office at [phone].

## 2024-07-29 NOTE — PROGRESS NOTES
REASON FOR VISIT:  fu UC, HP gastritis     HPI:  Tha Leon Jr. is a 48 y.o. male with a pmhx of HTN, PUD 2019 (gastric ulcer) attributed to NSAID use c/b GIB requiring transfusion, HP gastritis 2022, ulcerative proctitis who presents for fu.     Summary:   Admission 12/2019 (pulled from [facility]) for anemia and melena with drop in hgb from 12?7.8. Notes reports that EGD showed multiple gastric ulcers, non bleeding, biopsy taken, +HP. Unclear if treated or eradication confirmed. D/c on Prilosec 20 mg po bid for 2 months.  Initially seen for OV 10/2022 for fatigue and hematochezia. Labs on 10/11/22 with a hgb of 11.5, normocytic.   Planned for EGD/colonoscopy and repeat bloodwork in 3 months after iron supplementation.    Underwent EGD/colonoscopy 11/2022 with findings c/w HP gastritis and distal colitis. Started quad therapy with confirmatory breath testing in 2-3 months. Also with chronic colitis in rectum, started on topical mesalamine therapy.    OV 1/2023: Largely resolved UGI sx after initiation of HP therapy. Confirmatory breath testing in 2 months. Colonoscopy notable for ulcerative proctitis, likely etiology of prior hematochezia, intermittent compliance with topical therapy. Prefers suppositories to enemas, encouraged daily compliance. Plan for flex sig for disease assessment in 6-12 months. Bloodwork ordered (FCP, CRP, iron studies, cbc, cmp).    OV 2/2024: Lost to follow up x 1 year. Remained asx for nearly that whole time, now with recurrence of sx. Will restart therapy. Plan for flex sig for disease assessment in 6-12 months. Bloodwork ordered. Confirmatory breath testing to confirm HP eradication also ordered.     In the interim, went to the ED 4/2024 for abdominal pain. Found to have a leukocytosis of 14, CTAP with mild segmental wall thickening of the distal sigmoid colon and rectum again noted with multiple prominent perirectal lymph nodes measuring up to 1 cm possibly secondary to mild segmental  colitis/proctitis cannot rule out underlying neoplastic process, stable mildly prominent lymph nodes in the right lower quadrant again measuring up to 1.2 cm, mild hepatic steatosis, stable vertebral changes. He was admitted for observation. EGD on 4/4 noted gastritis and liquid in stomach. Flex sig 4/4 was left-sided amezquita I disease. Sent home on canasa supp, no steroids.     CRP 17.29 4/2024, 7.37 6/2024. FCP not done.     Seen by rheumatology 5/10/2024  for ankylosing spondylitis. Planned for possible initiation of biologic therapy.  Seen by PCP 6/17/2024 and could not tolerate iron pills.     Today, he reports he did Canasa for 30 days back in February which helped. He has since discontinued it. Endorses +urgency. Bms occur twice a day, no tenesmus, no abdominal pain. Bms vary from solid to runny. Occ spotting of blood. Feels he is at his baseline. No n/v. +joint pain c/w his diagnosis of ankylosing spondylitis.     HCV NR 11/2023. Labs with 6/2024  hgb 10.2, ferritin 304, % sat 15, likely component of ACD and HEMANTH. Has not tolerated iron or taken iron for extended periods in the past.     No fhx of CRC, pancreatic, gastric or esophageal cancer. No tobacco use, occ alcohol use, and occ marijuana use.      Prev endoscopic eval:   Flexible sigmoidoscopy 4/4/2024: The descending and distal transverse colon appeared normal.  Moderate, continuous edematous and erythematous mucosa with exudates in the sigmoid colon, rectosigmoid and rectum, Amezquita 1. Proximal extent ~ 40 cm from the anal verge and continuous through the rectum; consistent with prior diagnosis of ulcerative colitis; no bleeding was identified; performed cold forceps biopsy   Path: chronic active colitis, no dysplasia     EGD 4/4/2024: The upper third of the esophagus, middle third of the esophagus, lower third of the esophagus and GE junction appeared normal. Z-line is 38 cm from the incisors.  Moderate, patchy erythematous mucosa in the body of the  stomach and antrum; performed cold forceps biopsy to rule out H. pylori;  The cardia and fundus of the stomach appeared normal.  A moderate amount of liquid was found in the stomach and removed  via suction  The duodenal bulb, 1st part of the duodenum and 2nd part of the duodenum appeared normal.  Path: gastritis, - HP    EGD 11/30/2022: - Normal esophagus. - Z-line regular, 40 cm from the incisors. - Erythematous mucosa in the gastric body. Biopsied. - Erythematous congested duodenopathy. - The examination was otherwise normal.      Colonoscopy 11/30/2022: - Preparation of the colon was fair. - Aphtha in the terminal ileum. Biopsied. - Localized moderate inflammation was found in the mid rectum and in the distal rectum, rule out ulcerative colitis. Biopsied. - The examination was otherwise normal on direct and retroflexion views. - Biopsies for surveillance were taken from the cecum, ascending colon, transverse colon, descending colon and sigmoid colon.      Pathology:   A. GASTRIC ANTRUM AND BODY, BIOPSIES: --ACTIVE HELICOBACTER GASTRITIS. Note: Immunohistochemical staining highlights numerous microorganisms morphologically consistent with Helicobacter. The H. pylori drug susceptibility assay has been ordered and the results will appear separately in an addendum report.   B. TERMINAL ILEUM, BIOPSY: --FOCAL ACTIVE ILEITIS, NO GRANULOMAS IDENTIFIED.   C. CECUM AND ASCENDING COLON, BIOPSIES: --COLONIC MUCOSA, NO SIGNIFICANT HISTOPATHOLOGICAL ABNORMALITIES.   D. TRANSVERSE COLON, COLD BIOPSY: --COLONIC MUCOSA, NO SIGNIFICANT HISTOPATHOLOGICAL ABNORMALITIES.   E. DESCENDING AND SIGMOID COLON, BIOPSIES: --COLONIC MUCOSA, NO SIGNIFICANT HISTOPATHOLOGICAL ABNORMALITIES.   F. RECTUM, COLD BIOPSY: --DIFFUSE ACTIVE CHRONIC COLITIS, NO GRANULOMAS IDENTIFIED. Note: Histopathological features suggest ulcerative proctitis. However, infection associated proctitis and/or medication effects may cause similar histopathological  alterations.      EGD 12/2019 for anemia/melena: report not found, prog note from hospitalization notes 3 antral ulcers  Pathology 12/2019:   Stomach, antrum, biopsy - Chronic active gastritis with H. pylori-like organisms; see comment.  COMMENT: H. pylori-like organisms are identified on routine H&E stained sections.     Colonoscopy: ?possible (wife denies, pt recalls taking a bowel prep in the remote past)    REVIEW OF SYSTEMS  CONSTITUTIONAL: Negative for fevers  HEENT: Negative for frequent/significant headaches  RESPIRATORY: Negative for hemoptysis  CARDIOVASCULAR: Negative for chest pain  GI: See HPI  : Negative for hematuria  MUSCULOSKELETAL: Negative for arthralgia or myalgia  INTEGUMENTARY/SKIN: Negative for rash or skin lesion  HEMATOLOGY/LYMPHOLOGY: Negative for prolonged bleeding  ENDOCRINE: Negative for cold/heat intolerance  NEURO: Negative for encephalopathy  PSYCH: Negative for new changes in mood or affect      No Known Allergies    Past Medical History:   Diagnosis Date    Personal history of other diseases of the circulatory system     History of hypertension    Personal history of other diseases of the musculoskeletal system and connective tissue     History of low back pain       No past surgical history on file.    No family history on file.    Social History     Tobacco Use    Smoking status: Never     Passive exposure: Never    Smokeless tobacco: Never   Substance Use Topics    Alcohol use: Yes     Alcohol/week: 4.0 standard drinks of alcohol     Types: 4 Shots of liquor per week     Comment: sparingly       Current Outpatient Medications   Medication Sig Dispense Refill    amLODIPine (Norvasc) 5 mg tablet Take 1 tablet (5 mg) by mouth once daily. for blood pressure 90 tablet 3    cholecalciferol (Vitamin D3) 25 MCG (1000 UT) tablet Take 1 tablet (1,000 Units) by mouth once daily.      ferrous sulfate 325 (65 Fe) MG EC tablet Take 65 mg by mouth once daily with breakfast. Do not crush,  "chew, or split.      hydroCHLOROthiazide (HYDRODiuril) 12.5 mg tablet Take 1 tablet (12.5 mg) by mouth once daily. 90 tablet 3    melatonin 3 mg tablet Take 2 tablets (6 mg) by mouth.      omeprazole (PriLOSEC) 20 mg DR capsule Take 1 capsule (20 mg) by mouth twice a day.      mesalamine (Canasa) 1,000 mg suppository Insert 1 suppository (1,000 mg) into the rectum once daily at bedtime. 30 suppository 0     No current facility-administered medications for this visit.       PHYSICAL EXAM:  /70   Pulse 97   Temp 36.2 °C (97.1 °F) (Temporal)   Ht 1.905 m (6' 3\")   Wt 74.5 kg (164 lb 3.2 oz)   BMI 20.52 kg/m²    Gen: aaox3, NAD  Head: Normocephalic, atraumatic  Eyes: Sclera anicteric, conjunctiva pink   Neck: Supple  Heart: RRR, no murmurs, rubs or gallops  Lungs: CTAB, non-labored breathing  Abd: Soft, non-distended, non-tender, bowel sounds present, no rebound or guarding, no organomegaly  Ext: No LE edema b/l  Neuro: no gross focal deficits  Psych: Congruent mood and affect, appropriate insight and judgement  Skin: No jaundice    Lab Results   Component Value Date    ALT 6 (L) 06/17/2024    AST 16 06/17/2024    ALKPHOS 89 06/17/2024    BILITOT 0.3 06/17/2024       === 04/03/24 ===    CT ABDOMEN PELVIS W IV CONTRAST    - Impression -  1. Mild segmental wall thickening of the distal sigmoid colon and  rectum again noted with multiple prominent perirectal lymph nodes  measuring up to 1 cm. Findings may be secondary to a mild segmental  colitis/proctitis, although difficult to exclude underlying neoplastic  process. Recommend further evaluation with colonoscopy if not  previously performed.  2. Stable mildly prominent lymph nodes in the right lower quadrant  again noted measuring up to approximately 1.2 cm.  3. Mild hepatic steatosis.  4. Stable nonspecific sclerosis of L4 and L5 vertebral bodies and also  multilevel anterior sclerosis of T9 through L3 and anterior osseous  spurring. Osseous fusion of the " bilateral sacroiliac joints with  sclerosis and lucency, findings concerning for underlying  spondyloarthropathy, and follow-up rheumatology  consultation/evaluation is recommended.  Signed by Frank Olivia MD      ASSESSMENT  UC, proctosigmoiditis  HP gastritis s/p therapy, confirmed eradication (breath test + repeat biopsies)  Anemia, mixed ACD and HEMANTH  Ankylosing spondylitis     Presenting after hospitalization 3 months ago, repeat flex sig at that time with jensen 1 proctosigmoiditis while pt was off of maintenance therapy. Has responded well to topical therapy in the past, however compliance has been an issue. Will start oral mesalamine as well as topical although efficacy is less in the distal colon. Disease overall mild histologically and clinically at his baseline.  If histologically does not respond to oral/topical therapy will consider initiation of biologic therapy. Will work with rheumatology if his A S biologic also targets UC,    PLAN  --restart canasa supp  --start Apriso daily  --encouraged compliance with maintenance UC therapy  --CRP/FCP after 3 months of therapy  --flex sig for disease assessment in 6 months  --based on aforementioned diagnostic findings, consider possible biologic initiation if no improvement   --start iron supplementation bid, compliance with this was also encouraged  --repeat iron studies, CBC in 3 months  --check t-spot, HBV serologies, TPMT in anticipation of possible biologic therapy pending response  --CT enterography (active ileal inflammation in the past, r/o small bowel involvement/Crohns' disease)   --consider GES given findings on EGD  --colonoscopy 2027    FU 2 months     Consultation requested by Dr. Billy Quesada MD.   My final recommendations will be communicated back to the requesting physician by way of shared Medical record or letter to requesting physician via fax.    Signature: Cheryl Abdullahi MD    Date: 7/29/2024  Time: 10:07 AM

## 2024-08-28 ENCOUNTER — HOSPITAL ENCOUNTER (OUTPATIENT)
Dept: RADIOLOGY | Facility: HOSPITAL | Age: 49
Discharge: HOME | End: 2024-08-28
Payer: COMMERCIAL

## 2024-08-28 DIAGNOSIS — D50.9 IRON DEFICIENCY ANEMIA, UNSPECIFIED IRON DEFICIENCY ANEMIA TYPE: ICD-10-CM

## 2024-08-28 DIAGNOSIS — K51.30 ULCERATIVE RECTOSIGMOIDITIS WITHOUT COMPLICATION (MULTI): ICD-10-CM

## 2024-08-28 LAB
CREAT SERPL-MCNC: 0.93 MG/DL (ref 0.6–1.3)
GFR SERPL CREATININE-BSD FRML MDRD: >90 ML/MIN/1.73M*2

## 2024-08-28 PROCEDURE — 2550000001 HC RX 255 CONTRASTS: Performed by: NURSE PRACTITIONER

## 2024-08-28 PROCEDURE — 74177 CT ABD & PELVIS W/CONTRAST: CPT

## 2024-08-28 PROCEDURE — 82565 ASSAY OF CREATININE: CPT

## 2024-09-20 ENCOUNTER — TELEPHONE (OUTPATIENT)
Dept: GASTROENTEROLOGY | Facility: HOSPITAL | Age: 49
End: 2024-09-20
Payer: COMMERCIAL

## 2024-09-26 DIAGNOSIS — R93.5 ABNORMAL CT OF THE ABDOMEN: Primary | ICD-10-CM

## 2024-09-26 DIAGNOSIS — K76.9 LIVER LESION: ICD-10-CM

## 2024-11-15 ENCOUNTER — APPOINTMENT (OUTPATIENT)
Dept: RADIOLOGY | Facility: CLINIC | Age: 49
End: 2024-11-15
Payer: COMMERCIAL

## 2024-11-19 DIAGNOSIS — K51.919 ULCERATIVE COLITIS WITH COMPLICATION, UNSPECIFIED LOCATION (MULTI): Primary | ICD-10-CM

## 2024-11-19 RX ORDER — MESALAMINE 4 G/60 ML
4 KIT RECTAL NIGHTLY
Qty: 1800 ML | Refills: 0 | Status: SHIPPED | OUTPATIENT
Start: 2024-11-19 | End: 2024-12-19

## 2024-12-03 DIAGNOSIS — I10 PRIMARY HYPERTENSION: ICD-10-CM

## 2024-12-03 RX ORDER — AMLODIPINE BESYLATE 5 MG/1
5 TABLET ORAL DAILY
Qty: 90 TABLET | Refills: 0 | Status: SHIPPED | OUTPATIENT
Start: 2024-12-03 | End: 2025-12-03

## 2024-12-03 RX ORDER — HYDROCHLOROTHIAZIDE 12.5 MG/1
12.5 TABLET ORAL DAILY
Qty: 90 TABLET | Refills: 2 | Status: SHIPPED | OUTPATIENT
Start: 2024-12-03

## 2024-12-05 ENCOUNTER — APPOINTMENT (OUTPATIENT)
Dept: PRIMARY CARE | Facility: CLINIC | Age: 49
End: 2024-12-05
Payer: COMMERCIAL

## 2024-12-09 ENCOUNTER — APPOINTMENT (OUTPATIENT)
Dept: PRIMARY CARE | Facility: HOSPITAL | Age: 49
End: 2024-12-09
Payer: COMMERCIAL

## 2025-01-13 ENCOUNTER — APPOINTMENT (OUTPATIENT)
Dept: RADIOLOGY | Facility: HOSPITAL | Age: 50
End: 2025-01-13
Payer: COMMERCIAL

## 2025-01-13 LAB
ALBUMIN SERPL BCP-MCNC: 4.3 G/DL (ref 3.4–5)
ALP SERPL-CCNC: 100 U/L (ref 33–120)
ALT SERPL W P-5'-P-CCNC: 6 U/L (ref 10–52)
ANION GAP SERPL CALC-SCNC: 12 MMOL/L (ref 10–20)
AST SERPL W P-5'-P-CCNC: 12 U/L (ref 9–39)
BASOPHILS # BLD AUTO: 0.01 X10*3/UL (ref 0–0.1)
BASOPHILS NFR BLD AUTO: 0.1 %
BILIRUB SERPL-MCNC: 0.5 MG/DL (ref 0–1.2)
BUN SERPL-MCNC: 8 MG/DL (ref 6–23)
CALCIUM SERPL-MCNC: 10.1 MG/DL (ref 8.6–10.3)
CHLORIDE SERPL-SCNC: 100 MMOL/L (ref 98–107)
CO2 SERPL-SCNC: 32 MMOL/L (ref 21–32)
CREAT SERPL-MCNC: 0.95 MG/DL (ref 0.5–1.3)
EGFRCR SERPLBLD CKD-EPI 2021: >90 ML/MIN/1.73M*2
EOSINOPHIL # BLD AUTO: 0.19 X10*3/UL (ref 0–0.7)
EOSINOPHIL NFR BLD AUTO: 1.8 %
ERYTHROCYTE [DISTWIDTH] IN BLOOD BY AUTOMATED COUNT: 14.9 % (ref 11.5–14.5)
GLUCOSE SERPL-MCNC: 101 MG/DL (ref 74–99)
HCT VFR BLD AUTO: 37.8 % (ref 41–52)
HGB BLD-MCNC: 12.9 G/DL (ref 13.5–17.5)
IMM GRANULOCYTES # BLD AUTO: 0.02 X10*3/UL (ref 0–0.7)
IMM GRANULOCYTES NFR BLD AUTO: 0.2 % (ref 0–0.9)
LIPASE SERPL-CCNC: 11 U/L (ref 9–82)
LYMPHOCYTES # BLD AUTO: 2.92 X10*3/UL (ref 1.2–4.8)
LYMPHOCYTES NFR BLD AUTO: 27.5 %
MCH RBC QN AUTO: 28.1 PG (ref 26–34)
MCHC RBC AUTO-ENTMCNC: 34.1 G/DL (ref 32–36)
MCV RBC AUTO: 82 FL (ref 80–100)
MONOCYTES # BLD AUTO: 0.78 X10*3/UL (ref 0.1–1)
MONOCYTES NFR BLD AUTO: 7.3 %
NEUTROPHILS # BLD AUTO: 6.7 X10*3/UL (ref 1.2–7.7)
NEUTROPHILS NFR BLD AUTO: 63.1 %
NRBC BLD-RTO: 0 /100 WBCS (ref 0–0)
PLATELET # BLD AUTO: 416 X10*3/UL (ref 150–450)
POTASSIUM SERPL-SCNC: 3.8 MMOL/L (ref 3.5–5.3)
PROT SERPL-MCNC: 8.6 G/DL (ref 6.4–8.2)
RBC # BLD AUTO: 4.59 X10*6/UL (ref 4.5–5.9)
SODIUM SERPL-SCNC: 140 MMOL/L (ref 136–145)
WBC # BLD AUTO: 10.6 X10*3/UL (ref 4.4–11.3)

## 2025-01-13 PROCEDURE — 2550000001 HC RX 255 CONTRASTS: Performed by: NURSE PRACTITIONER

## 2025-01-13 PROCEDURE — 2500000004 HC RX 250 GENERAL PHARMACY W/ HCPCS (ALT 636 FOR OP/ED): Performed by: NURSE PRACTITIONER

## 2025-01-13 PROCEDURE — 85652 RBC SED RATE AUTOMATED: CPT | Performed by: INTERNAL MEDICINE

## 2025-01-13 PROCEDURE — 36415 COLL VENOUS BLD VENIPUNCTURE: CPT | Performed by: NURSE PRACTITIONER

## 2025-01-13 PROCEDURE — 86140 C-REACTIVE PROTEIN: CPT | Performed by: INTERNAL MEDICINE

## 2025-01-13 PROCEDURE — 74177 CT ABD & PELVIS W/CONTRAST: CPT | Performed by: RADIOLOGY

## 2025-01-13 PROCEDURE — 83690 ASSAY OF LIPASE: CPT | Performed by: NURSE PRACTITIONER

## 2025-01-13 PROCEDURE — 74177 CT ABD & PELVIS W/CONTRAST: CPT

## 2025-01-13 PROCEDURE — 96374 THER/PROPH/DIAG INJ IV PUSH: CPT | Mod: 59 | Performed by: INTERNAL MEDICINE

## 2025-01-13 PROCEDURE — 80053 COMPREHEN METABOLIC PANEL: CPT | Performed by: NURSE PRACTITIONER

## 2025-01-13 PROCEDURE — 99285 EMERGENCY DEPT VISIT HI MDM: CPT | Mod: 25 | Performed by: INTERNAL MEDICINE

## 2025-01-13 PROCEDURE — 85025 COMPLETE CBC W/AUTO DIFF WBC: CPT | Performed by: NURSE PRACTITIONER

## 2025-01-13 RX ORDER — ONDANSETRON HYDROCHLORIDE 2 MG/ML
4 INJECTION, SOLUTION INTRAVENOUS ONCE
Status: COMPLETED | OUTPATIENT
Start: 2025-01-13 | End: 2025-01-13

## 2025-01-13 RX ORDER — MORPHINE SULFATE 4 MG/ML
4 INJECTION, SOLUTION INTRAMUSCULAR; INTRAVENOUS ONCE
Status: DISCONTINUED | OUTPATIENT
Start: 2025-01-13 | End: 2025-01-14 | Stop reason: HOSPADM

## 2025-01-13 RX ADMIN — IOHEXOL 75 ML: 350 INJECTION, SOLUTION INTRAVENOUS at 23:29

## 2025-01-13 RX ADMIN — ONDANSETRON 4 MG: 2 INJECTION, SOLUTION INTRAMUSCULAR; INTRAVENOUS at 22:32

## 2025-01-13 ASSESSMENT — PAIN SCALES - GENERAL: PAINLEVEL_OUTOF10: 10 - WORST POSSIBLE PAIN

## 2025-01-13 ASSESSMENT — PAIN DESCRIPTION - PAIN TYPE: TYPE: ACUTE PAIN

## 2025-01-13 ASSESSMENT — PAIN DESCRIPTION - DESCRIPTORS: DESCRIPTORS: SHARP

## 2025-01-13 ASSESSMENT — PAIN - FUNCTIONAL ASSESSMENT: PAIN_FUNCTIONAL_ASSESSMENT: 0-10

## 2025-01-13 ASSESSMENT — PAIN DESCRIPTION - LOCATION: LOCATION: ABDOMEN

## 2025-01-14 ENCOUNTER — HOSPITAL ENCOUNTER (EMERGENCY)
Facility: HOSPITAL | Age: 50
Discharge: HOME | End: 2025-01-14
Attending: INTERNAL MEDICINE
Payer: COMMERCIAL

## 2025-01-14 VITALS
BODY MASS INDEX: 21.76 KG/M2 | SYSTOLIC BLOOD PRESSURE: 129 MMHG | TEMPERATURE: 99.1 F | DIASTOLIC BLOOD PRESSURE: 81 MMHG | HEART RATE: 79 BPM | HEIGHT: 75 IN | OXYGEN SATURATION: 98 % | WEIGHT: 175 LBS | RESPIRATION RATE: 16 BRPM

## 2025-01-14 DIAGNOSIS — K51.00 ULCERATIVE PANCOLITIS WITHOUT COMPLICATION (MULTI): Primary | ICD-10-CM

## 2025-01-14 LAB
CRP SERPL-MCNC: 1.12 MG/DL
ERYTHROCYTE [SEDIMENTATION RATE] IN BLOOD BY WESTERGREN METHOD: 88 MM/H (ref 0–20)
LACTATE SERPL-SCNC: 0.8 MMOL/L (ref 0.4–2)

## 2025-01-14 PROCEDURE — 36415 COLL VENOUS BLD VENIPUNCTURE: CPT | Performed by: INTERNAL MEDICINE

## 2025-01-14 PROCEDURE — 83605 ASSAY OF LACTIC ACID: CPT | Performed by: INTERNAL MEDICINE

## 2025-01-14 RX ORDER — PREDNISONE 20 MG/1
40 TABLET ORAL DAILY
Qty: 14 TABLET | Refills: 0 | Status: SHIPPED | OUTPATIENT
Start: 2025-01-14 | End: 2025-01-21

## 2025-01-14 RX ORDER — ONDANSETRON 4 MG/1
4 TABLET, ORALLY DISINTEGRATING ORAL EVERY 8 HOURS PRN
Qty: 15 TABLET | Refills: 0 | Status: SHIPPED | OUTPATIENT
Start: 2025-01-14

## 2025-01-14 NOTE — ED PROVIDER NOTES
HPI     CC: Abdominal Pain     HPI: Tha Leon Jr. is a 49 y.o. male with a history of  HTN, anemia, ulcerative colitis, peptic ulcer disease attributed to NSAID use and H. pylori, presents with epigastric pain and concern for UC flare.  Patient states his ulcerative colitis has been in a flare for the past week.  He has been having mild abdominal pain, diarrhea, and nausea.  He has not had any bloody stool in over 5 days.  Over the last few days he has developed increasing abdominal pain with sharp pains in the center of his abdomen coming and going every 30 minutes to 1 hour.  His family encouraged him to come here to get checked.  He takes Prilosec and mesalamine enemas.  He has never been on steroids.  He denies any fevers or chills.  He states he was told he needed some type of contrast study to check his liver.  He was wondering if he can get that done here.  He tried to get in with his GI doctor but was told he needed the liver study first.  He states that since receiving Zofran which was ordered by the triage provider, he is completely asymptomatic.    ROS: 10-point review of systems was performed and is otherwise negative except as noted in HPI.    Limitations to history: N/A    Independent Historians: N/A    External Records Reviewed: Outpatient notes in EMR    Past Medical History: Noncontributory except per HPI     Past Surgical History: Noncontributory except per HPI     Family History: Reviewed and noncontributory     Social History:  Denies tobacco. Denies ETOH. Denies illicit drugs.    Social Determinants Affecting Care: N/A    No Known Allergies    Home Meds:   Current Outpatient Medications   Medication Instructions    amLODIPine (NORVASC) 5 mg, oral, Daily, for blood pressure    cholecalciferol (VITAMIN D3) 1,000 Units, oral, Daily    hydroCHLOROthiazide (MICROZIDE) 12.5 mg, oral, Daily    melatonin 6 mg, oral    mesalamine (CANASA) 1,000 mg, rectal, Nightly    mesalamine ER (APRISO) 1.5 g,  "oral, Daily, Do not crush or chew.    omeprazole (PRILOSEC) 20 mg, oral, 2 times daily    ondansetron ODT (ZOFRAN-ODT) 4 mg, oral, Every 8 hours PRN    predniSONE (DELTASONE) 40 mg, oral, Daily        Physical Exam     ED Triage Vitals [01/13/25 1920]   Temperature Heart Rate Respirations BP   37.3 °C (99.1 °F) 84 18 132/83      Pulse Ox Temp Source Heart Rate Source Patient Position   97 % Oral Monitor --      BP Location FiO2 (%)     -- --         Heart Rate:  [79-84]   Temperature:  [37.3 °C (99.1 °F)]   Respirations:  [16-18]   BP: (129-132)/(81-83)   Height:  [190.5 cm (6' 3\")]   Weight:  [79.4 kg (175 lb)]   Pulse Ox:  [97 %-98 %]      Physical Exam  Vitals and nursing note reviewed.     CONSTITUTIONAL: Well appearing, well nourished, in no acute distress.   HENMT: Head atraumatic. Airway patent. Nasal mucosa clear. Mouth with normal mucosa, clear oropharynx. Uvula midline. Neck supple.    EYES: Clear bilaterally, pupils equally round and reactive to light.   CARDIOVASCULAR: Normal rate, regular rhythm.  Heart sounds S1, S2.  No murmurs, rubs or gallops. Normal pulses. Capillary refill < 2 sec.   RESPIRATORY: No increased work of breathing. Breath sounds clear and equal bilaterally.  GASTROINTESTINAL: Abdomen soft, non-distended, non-tender. No rebound, no guarding. Normal bowel sounds. No palpable masses.  GENITOURINARY:  No CVA tenderness.  MUSCULOSKELETAL: Spine appears normal, range of motion is not limited, no muscle or joint tenderness. No edema.   NEUROLOGICAL: Alert and oriented, no asymmetry, moving all extremities equally.  SKIN: Warm, dry and intact. No rash or notable lesions.  PSYCHIATRIC: Normal mood and affect.  HEME/LYMPH: No adenopathy or splenomegaly.    Diagnostic Results      ECG: ECGs read and interpreted by me. See ED Course, below, for interpretation.    Labs Reviewed   CBC WITH AUTO DIFFERENTIAL - Abnormal       Result Value    WBC 10.6      nRBC 0.0      RBC 4.59      Hemoglobin 12.9 " (*)     Hematocrit 37.8 (*)     MCV 82      MCH 28.1      MCHC 34.1      RDW 14.9 (*)     Platelets 416      Neutrophils % 63.1      Immature Granulocytes %, Automated 0.2      Lymphocytes % 27.5      Monocytes % 7.3      Eosinophils % 1.8      Basophils % 0.1      Neutrophils Absolute 6.70      Immature Granulocytes Absolute, Automated 0.02      Lymphocytes Absolute 2.92      Monocytes Absolute 0.78      Eosinophils Absolute 0.19      Basophils Absolute 0.01     COMPREHENSIVE METABOLIC PANEL - Abnormal    Glucose 101 (*)     Sodium 140      Potassium 3.8      Chloride 100      Bicarbonate 32      Anion Gap 12      Urea Nitrogen 8      Creatinine 0.95      eGFR >90      Calcium 10.1      Albumin 4.3      Alkaline Phosphatase 100      Total Protein 8.6 (*)     AST 12      Bilirubin, Total 0.5      ALT 6 (*)    SEDIMENTATION RATE, AUTOMATED - Abnormal    Sedimentation Rate 88 (*)    C-REACTIVE PROTEIN - Abnormal    C-Reactive Protein 1.12 (*)    LIPASE - Normal    Lipase 11      Narrative:     Venipuncture immediately after or during the administration of Metamizole may lead to falsely low results. Testing should be performed immediately prior to Metamizole dosing.   LACTATE - Normal    Lactate 0.8      Narrative:     Venipuncture immediately after or during the administration of Metamizole may lead to falsely low results. Testing should be performed immediately prior to Metamizole dosing.         CT abdomen pelvis w IV contrast   Final Result   1. Wall thickening of the transverse, descending, and sigmoid colon   with surrounding fat stranding concerning for colitis. The findings   appear new from 08/28/2024.   2. Fusion of the sacroiliac joints, likely secondary to sacroiliitis,   possibly in the setting of inflammatory bowel disease related   spondyloarthropathy or ankylosing spondylitis. Consider rheumatologic   follow-up.   3. Scattered coarse pancreatic parenchymal calcifications may reflect   sequelae of  chronic pancreatitis.        Signed by: Geoff Bender 1/14/2025 1:01 AM   Dictation workstation:   [id number]                    Kaela Coma Scale Score: 15                  Procedure  Procedures    ED Course & MDM   Assessment/Plan:   Tha Leon Jr. is a 49 y.o. male with a history of  HTN, anemia, ulcerative colitis, peptic ulcer disease attributed to NSAID use and H. pylori, presents with epigastric pain and concern for UC flare.  He has been having some diarrhea, mild abdominal pains and nausea for the past week.  He has nausea is worsened over the past few days with intermittent crampy abdominal pain in the center of his abdomen.  He is asymptomatic currently after receiving Zofran.  His abdominal exam is benign.  He is hemodynamically stable and well-appearing.  Workup was initiated by triage provider with labs and CTAP. See below for details of ED course and ultimate disposition.    Medications   morphine injection 4 mg (4 mg intravenous Not Given 1/14/25 0242)   ondansetron (Zofran) injection 4 mg (4 mg intravenous Given 1/13/25 2232)   iohexol (OMNIPaque) 350 mg iodine/mL solution 75 mL (75 mL intravenous Given 1/13/25 2329)        ED Course as of 01/14/25 0310   Mon Jan 13, 2025   2229 Labs are largely reassuring with CBC showing no leukocytosis 10.6, mild anemia 12.9, normal platelets, normal CMP, lactate, lipase, mildly elevated ESR 88 and CRP 1.12. [CG]   Tue Jan 14, 2025   0116 CTAP 1. Wall thickening of the transverse, descending, and sigmoid colon with surrounding fat stranding concerning for colitis. The findings appear new from 08/28/2024.  2. Fusion of the sacroiliac joints, likely secondary to sacroiliitis, possibly in the setting of inflammatory bowel disease related spondyloarthropathy or ankylosing spondylitis. Consider rheumatologic follow-up.  3. Scattered coarse pancreatic parenchymal calcifications may reflect sequelae of chronic pancreatitis.   [CG]   3569 I did attempt to  send a message to patient's GI physician Dr. Abdullahi but she is not on call overnight. I advised the patient to call her office in the morning to to try to get a sooner appointment.  I gave him a weeks worth of steroids to treat his suspected UC flare and a prescription for Zofran for nausea which helped him significantly here.  Patient was discharged home with anticipatory guidance and strict return precautions. [CG]      ED Course User Index  [CG] Delores Hughes MD         Diagnoses as of 01/14/25 0310   Ulcerative pancolitis without complication (Multi)       Disposition:   DISCHARGE.  The patient was discharged with both verbal and written anticipatory guidance and strict return precautions. Discharge diagnosis, instructions and plan were discussed and understood. I emphasized the importance of following up with their primary care provider within 24-48 hours and with any referrals in the timeframe recommended. At the time of discharge the patient was comfortable and was in no apparent distress. Patient is aware of diagnostic uncertainty and was notified though testing is negative here, there is a very small chance that pathology may be missed.  The patient understands these risks and the patient/family understood to call 911 or return immediately to the emergency department if the symptoms worsen or if they have any additional concerns.      FOLLOW UP  Primary care provider in 1-2 days.      ED Prescriptions       Medication Sig Dispense Start Date End Date Auth. Provider    predniSONE (Deltasone) 20 mg tablet Take 2 tablets (40 mg) by mouth once daily for 7 days. 14 tablet 1/14/2025 1/21/2025 Delores Hughes MD    ondansetron ODT (Zofran-ODT) 4 mg disintegrating tablet Dissolve 1 tablet (4 mg) in the mouth every 8 hours if needed for nausea or vomiting. 15 tablet 1/14/2025 -- MD Delores Heredia MD  EM/IM/Peds    This note was dictated by speech recognition. Minor errors  in transcription may be present.     Delores Hughes MD  01/14/25 1510

## 2025-01-14 NOTE — ED TRIAGE NOTES
Pt BIBA c/c of abdominal pain sharp in nature  nausea last BM was 2 days ago. Blood in BM 4 days ago. States mixture of light red and black    Pt A&Ox3, pt alert calm cooperative with care. Pt resp even and unlabored.     PMH: colitis , HTN

## 2025-01-14 NOTE — DISCHARGE INSTRUCTIONS
You were seen today for abdominal pain.  You are likely having a flare of your ulcerative colitis.  We prescribed you a course of steroids. Call your GI doctor in the morning.  We sent an FYI chat to her and hopefully she will reach out.  Your evaluation was not concerning for an emergency at this time. Please see the attached information sheet for information about your condition, how to care for your condition at home, and reasons to return to the emergency department. Take any prescriptions written today as prescribed. You should call your primary care provider within 24 hours to tell them about today's visit, including any new medications or medication changes, as he or she may want to see you in the office for further evaluation. If you do not have a primary care provider, call  [phone] for an appointment. We offer in-person office visits as well as virtual options. Please do not hesitate to call  [phone] or return to the emergency department with any new or unresolved concerns or symptoms. Thank you for choosing [facility] for your care.

## 2025-01-14 NOTE — ED TRIAGE NOTES
This patient was seen in triage.     Vitals are noted.      HPI:  Patient is a 49-year-old male with history of ulcerative colitis presenting to the emergency department with sharp, stabbing, intermittent midepigastric to right upper quadrant abdominal pain with associated nausea, vomiting.  Denies any diarrhea.  States that he is no longer vomiting up anything and is now dry heaving due to the nausea.  Denies any fevers, chills.  Denies any history of abdominal surgeries.  Denies any flank pain, urinary symptoms.    Focused PE:  Gen: Well-appearing, not in acute distress  Cardiovascular: Regular rate, normal rhythm, no murmur, no gallop  Respiratory: No adventitious lung sounds auscultated.  Abdomen: No reproducible abdominal tenderness upon palpation,  physical exam may be limited by patient positioning sitting up in a chair  Neuro:  Alert and Oriented, speech clear and coherent     Plan:  IV, lab work, analgesics, imaging.        For the remainder of the patient's workup and ED course, please see the main ED provider note.  We discussed need for diagnostic testing including lab studies and imaging.  We also discussed that they may be asked to wait in the waiting room while these test are pending.  They understand that if they choose to leave without having the testing completed or resulted that we cannot rule out acute life-threatening illnesses and the risks involved to lead to worsening condition, permanent disability or even death.

## 2025-01-28 ENCOUNTER — APPOINTMENT (OUTPATIENT)
Dept: PRIMARY CARE | Facility: CLINIC | Age: 50
End: 2025-01-28
Payer: COMMERCIAL

## 2025-01-28 VITALS
RESPIRATION RATE: 18 BRPM | HEIGHT: 75 IN | OXYGEN SATURATION: 98 % | HEART RATE: 78 BPM | TEMPERATURE: 98 F | DIASTOLIC BLOOD PRESSURE: 86 MMHG | SYSTOLIC BLOOD PRESSURE: 125 MMHG | BODY MASS INDEX: 21.87 KG/M2

## 2025-01-28 DIAGNOSIS — K51.014: Primary | ICD-10-CM

## 2025-01-28 PROBLEM — M45.0 ANKYLOSING SPONDYLITIS OF MULTIPLE SITES IN SPINE (MULTI): Status: RESOLVED | Noted: 2024-05-10 | Resolved: 2025-01-28

## 2025-01-28 PROBLEM — L40.50 PSORIATIC ARTHRITIS (MULTI): Status: RESOLVED | Noted: 2024-05-10 | Resolved: 2025-01-28

## 2025-01-28 PROCEDURE — 1036F TOBACCO NON-USER: CPT | Performed by: INTERNAL MEDICINE

## 2025-01-28 PROCEDURE — 3079F DIAST BP 80-89 MM HG: CPT | Performed by: INTERNAL MEDICINE

## 2025-01-28 PROCEDURE — 99214 OFFICE O/P EST MOD 30 MIN: CPT | Performed by: INTERNAL MEDICINE

## 2025-01-28 PROCEDURE — 3074F SYST BP LT 130 MM HG: CPT | Performed by: INTERNAL MEDICINE

## 2025-01-28 ASSESSMENT — ENCOUNTER SYMPTOMS
ANAL BLEEDING: 1
WEAKNESS: 0
ACTIVITY CHANGE: 0
SHORTNESS OF BREATH: 0
MYALGIAS: 0
CHILLS: 0
CHEST TIGHTNESS: 0
AGITATION: 0
ABDOMINAL PAIN: 1
PALPITATIONS: 0
BLOOD IN STOOL: 1

## 2025-01-28 NOTE — LETTER
January 28, 2025     Patient: Tha Leon Jr.   YOB: 1975   Date of Visit: 1/28/2025       To Whom It May Concern:    Tha Leon was seen in my clinic on 1/28/2025 at 11:15 am. Please excuse Tha  from work from January 2, 2025 until January 29, 2025 for an extended illness.    If you have any questions or concerns, please don't hesitate to call.         Sincerely,         Billy Quesada MD        CC: No Recipients

## 2025-01-28 NOTE — PROGRESS NOTES
"Subjective   Patient ID: Tha Leon Jr. is a 49 y.o. male who presents for No chief complaint on file. He has been off for the last few week due to ulcerative colitis since January 2nd.  He diet had not been as good over the holidays.  Sugar makes it worse.     HPI     Review of Systems   Constitutional:  Negative for activity change and chills.   HENT:  Negative for congestion.    Respiratory:  Negative for chest tightness and shortness of breath.    Cardiovascular:  Negative for chest pain and palpitations.   Gastrointestinal:  Positive for abdominal pain, anal bleeding and blood in stool.   Musculoskeletal:  Negative for myalgias.   Neurological:  Negative for weakness.   Psychiatric/Behavioral:  Negative for agitation and behavioral problems.      Objective   /86 (BP Location: Right arm, BP Cuff Size: Adult)   Pulse 78   Temp 36.7 °C (98 °F)   Resp 18   Ht 1.905 m (6' 3\")   SpO2 98%   BMI 21.87 kg/m²     Physical Exam  Constitutional:       Appearance: Normal appearance.   HENT:      Head: Normocephalic and atraumatic.      Nose: Nose normal.      Mouth/Throat:      Mouth: Mucous membranes are moist.   Eyes:      Extraocular Movements: Extraocular movements intact.   Cardiovascular:      Rate and Rhythm: Normal rate and regular rhythm.   Pulmonary:      Effort: No respiratory distress.      Breath sounds: No wheezing.   Abdominal:      General: Bowel sounds are normal.      Palpations: Abdomen is soft.   Neurological:      General: No focal deficit present.   Psychiatric:         Mood and Affect: Mood normal.         Behavior: Behavior normal.       Assessment/Plan   Assessment & Plan  Ulcerative pancolitis with abscess (Multi)  Will fill out forms.  The patient definitely has a problem with ulcerative colitis, and he will do better with watching his diet in terms of trying to prevent any exacerbations.  He will also continue to follow with GI.              "

## 2025-01-28 NOTE — ASSESSMENT & PLAN NOTE
Will fill out forms.  The patient definitely has a problem with ulcerative colitis, and he will do better with watching his diet in terms of trying to prevent any exacerbations.  He will also continue to follow with GI.

## 2025-02-06 NOTE — PROGRESS NOTES
Gastroenterology Consult Service INITIAL CONSULT Note  Department of Gastroenterology & Hepatology  [facility]    [facility]  February 6, 2025   Patient: Tha Leon Jr.    Medical Record: [id number]    Reason for Consult: ***  Requesting Service: ***    Subjective     Tha Leon Jr. is a 49 y.o. male with  ***. Gastroenterology is consulted for ***    12 point ROS otherwise negative, unless indicated above.     Past Medical History:    Past Medical History:   Diagnosis Date    Personal history of other diseases of the circulatory system     History of hypertension    Personal history of other diseases of the musculoskeletal system and connective tissue     History of low back pain       Home Medications  (Not in a hospital admission)      Surgical History:  No past surgical history on file.    Allergies:  No Known Allergies    Social History:    Social History     Socioeconomic History    Marital status:      Spouse name: Not on file    Number of children: Not on file    Years of education: Not on file    Highest education level: Not on file   Occupational History    Not on file   Tobacco Use    Smoking status: Never     Passive exposure: Never    Smokeless tobacco: Never   Vaping Use    Vaping status: Never Used   Substance and Sexual Activity    Alcohol use: Yes     Alcohol/week: 4.0 standard drinks of alcohol     Types: 4 Shots of liquor per week     Comment: sparingly    Drug use: Yes     Types: Marijuana    Sexual activity: Yes   Other Topics Concern    Not on file   Social History Narrative    Not on file     Social Drivers of Health     Financial Resource Strain: Low Risk  (4/4/2024)    Overall Financial Resource Strain (CARDIA)     Difficulty of Paying Living Expenses: Not very hard   Food Insecurity: No Food Insecurity (2/8/2024)    Hunger Vital Sign     Worried About Running Out of Food in the Last Year: Never true     Ran Out of Food in the Last  Year: Never true   Transportation Needs: No Transportation Needs (4/4/2024)    PRAPARE - Transportation     Lack of Transportation (Medical): No     Lack of Transportation (Non-Medical): No   Physical Activity: Sufficiently Active (11/30/2023)    Exercise Vital Sign     Days of Exercise per Week: 6 days     Minutes of Exercise per Session: 60 min   Stress: No Stress Concern Present (11/30/2023)    [employer] - Occupational Stress Questionnaire     Feeling of Stress : Not at all   Social Connections: Unknown (11/30/2023)    Social Connection and Isolation Panel [NHANES]     Frequency of Communication with Friends and Family: More than three times a week     Frequency of Social Gatherings with Friends and Family: More than three times a week     Attends Worship Services: More than 4 times per year     Active Member of Clubs or Organizations: Patient declined     Attends Club or Organization Meetings: Patient declined     Marital Status: Patient declined   Intimate Partner Violence: Not At Risk (11/30/2023)    Humiliation, Afraid, Rape, and Kick questionnaire     Fear of Current or Ex-Partner: No     Emotionally Abused: No     Physically Abused: No     Sexually Abused: No   Housing Stability: Low Risk  (4/4/2024)    Housing Stability Vital Sign     Unable to Pay for Housing in the Last Year: No     Number of Places Lived in the Last Year: 1     Unstable Housing in the Last Year: No       Family History:  No family history on file.  No family history of GI or liver disease.     Objective     Vitals:  There were no vitals filed for this visit.  Failed to redirect to the Timeline version of the [employer] SmartLink.  [unfilled]    Physical Exam  Gen: well appearing, A+Ox3, in no acute distress  HEENT: PEERL, EOMI, sclera anicteric, no conjunctival injection, MMM, no oropharyngeal lesions, no LAD  Resp: CTAB, normal breath sounds, no wheezing/crackles/ rales  CV: RRR, normal S1/S2, no murmurs/  "rubs/gallops, no JVD  GI: non-tender, non-distended, normal BSs in all 4 quadrants, no rebound or guarding, no masses palpable, no HSM  MSK: warm and well perfused, no edema  Neuro: A+Ox3, no focal deficits, no asterixis  Skin: warm and dry, no lesions, no rashes     Rectal: no hemorrhoids or fissures appreciated externally or internally,  *** stool internally, sphincter tone intact    Labs:   Lab Results   Component Value Date    WBC 10.6 01/13/2025    HGB 12.9 (L) 01/13/2025    HCT 37.8 (L) 01/13/2025    MCV 82 01/13/2025     01/13/2025     Lab Results   Component Value Date    GLUCOSE 101 (H) 01/13/2025    CALCIUM 10.1 01/13/2025     01/13/2025    K 3.8 01/13/2025    CO2 32 01/13/2025     01/13/2025    BUN 8 01/13/2025    CREATININE 0.95 01/13/2025     Lab Results   Component Value Date    ALT 6 (L) 01/13/2025    AST 12 01/13/2025    ALKPHOS 100 01/13/2025    BILITOT 0.5 01/13/2025     No results found for: \"INR\", \"PROTIME\"        Imaging: ***    === 01/14/25 ===    CT ABDOMEN PELVIS W IV CONTRAST    - Impression -  1. Wall thickening of the transverse, descending, and sigmoid colon  with surrounding fat stranding concerning for colitis. The findings  appear new from 08/28/2024.  2. Fusion of the sacroiliac joints, likely secondary to sacroiliitis,  possibly in the setting of inflammatory bowel disease related  spondyloarthropathy or ankylosing spondylitis. Consider rheumatologic  follow-up.  3. Scattered coarse pancreatic parenchymal calcifications may reflect  sequelae of chronic pancreatitis.    Signed by: Geoff Bender 1/14/2025 1:01 AM  Dictation workstation:   [id number]      GI procedures:    Assessment & Plan         Patient seen and discussed with attending,  ***.     Eric Devries MD, PhD  Gastroenterology Fellow, PGY-6  [facility]   Division of Gastroenterology and Liver Disease      Thank you for the consultation. Gastroenterology will " continue to the follow the patient.   Please do not hesitate to contact me on Haiku or page [phone] if there are any further questions between the weekday hours of 7 AM - 5 PM.   If there is an urgent concern during the weekend, after-hours, or holidays; then please [place] [phone]. Thank you.    SIGNATURE: Eric Devries MD PATIENT NAME: Tha Leon Jr.   DATE: February 6, 2025 MRN: [id number]

## 2025-03-10 ENCOUNTER — APPOINTMENT (OUTPATIENT)
Dept: GASTROENTEROLOGY | Facility: CLINIC | Age: 50
End: 2025-03-10
Payer: COMMERCIAL

## 2025-04-10 DIAGNOSIS — I10 PRIMARY HYPERTENSION: ICD-10-CM

## 2025-04-10 RX ORDER — AMLODIPINE BESYLATE 5 MG/1
5 TABLET ORAL DAILY
Qty: 90 TABLET | Refills: 3 | Status: SHIPPED | OUTPATIENT
Start: 2025-04-10 | End: 2026-04-10

## 2025-06-16 ENCOUNTER — OFFICE VISIT (OUTPATIENT)
Dept: PRIMARY CARE | Facility: HOSPITAL | Age: 50
End: 2025-06-16
Payer: COMMERCIAL

## 2025-06-16 VITALS
HEIGHT: 75 IN | DIASTOLIC BLOOD PRESSURE: 81 MMHG | SYSTOLIC BLOOD PRESSURE: 125 MMHG | HEART RATE: 93 BPM | TEMPERATURE: 97 F | OXYGEN SATURATION: 99 % | BODY MASS INDEX: 23.13 KG/M2 | WEIGHT: 186 LBS

## 2025-06-16 DIAGNOSIS — I10 HYPERTENSION, UNSPECIFIED TYPE: ICD-10-CM

## 2025-06-16 DIAGNOSIS — Z12.5 PROSTATE CANCER SCREENING: ICD-10-CM

## 2025-06-16 DIAGNOSIS — E55.9 HYPOVITAMINOSIS D: ICD-10-CM

## 2025-06-16 DIAGNOSIS — K51.30 ULCERATIVE RECTOSIGMOIDITIS WITHOUT COMPLICATION (MULTI): ICD-10-CM

## 2025-06-16 DIAGNOSIS — M25.551 RIGHT HIP PAIN: Primary | ICD-10-CM

## 2025-06-16 PROCEDURE — 3074F SYST BP LT 130 MM HG: CPT | Performed by: INTERNAL MEDICINE

## 2025-06-16 PROCEDURE — 99214 OFFICE O/P EST MOD 30 MIN: CPT | Performed by: INTERNAL MEDICINE

## 2025-06-16 PROCEDURE — 3008F BODY MASS INDEX DOCD: CPT | Performed by: INTERNAL MEDICINE

## 2025-06-16 PROCEDURE — 3079F DIAST BP 80-89 MM HG: CPT | Performed by: INTERNAL MEDICINE

## 2025-06-16 PROCEDURE — 1036F TOBACCO NON-USER: CPT | Performed by: INTERNAL MEDICINE

## 2025-06-16 ASSESSMENT — ENCOUNTER SYMPTOMS
CHILLS: 0
ACTIVITY CHANGE: 0
WEAKNESS: 0
PALPITATIONS: 0
CHEST TIGHTNESS: 0
SHORTNESS OF BREATH: 0
DIARRHEA: 0
MYALGIAS: 0
AGITATION: 0
ARTHRALGIAS: 1
DEPRESSION: 0
NAUSEA: 0

## 2025-06-16 ASSESSMENT — PATIENT HEALTH QUESTIONNAIRE - PHQ9
1. LITTLE INTEREST OR PLEASURE IN DOING THINGS: NOT AT ALL
SUM OF ALL RESPONSES TO PHQ9 QUESTIONS 1 AND 2: 0
2. FEELING DOWN, DEPRESSED OR HOPELESS: NOT AT ALL

## 2025-06-16 ASSESSMENT — PAIN SCALES - GENERAL: PAINLEVEL_OUTOF10: 0-NO PAIN

## 2025-06-16 NOTE — ASSESSMENT & PLAN NOTE
Good control.  Orders:    Comprehensive Metabolic Panel; Future    Lipid Panel Non-Fasting; Future

## 2025-06-16 NOTE — PROGRESS NOTES
"Subjective   Patient ID: Tha Leon Jr. is a 49 y.o. male who presents for Sick Visit (Sharp hip pain x 1 year/Right hip discomfort).  Recurrent quick stabbing pain in his right hip.  Started a year ago and occurs 2 to 3 times a week.  Not reproducible.  Occurs without provocation. He is walking and standing without problems.    HPI     Review of Systems   Constitutional:  Negative for activity change and chills.   HENT:  Negative for congestion.    Respiratory:  Negative for chest tightness and shortness of breath.    Cardiovascular:  Negative for chest pain and palpitations.   Gastrointestinal:  Negative for diarrhea and nausea.   Musculoskeletal:  Positive for arthralgias and gait problem. Negative for myalgias.   Neurological:  Negative for weakness.   Psychiatric/Behavioral:  Negative for agitation and behavioral problems.        Objective   /81 (BP Location: Left arm, Patient Position: Sitting, BP Cuff Size: Large adult)   Pulse 93   Temp 36.1 °C (97 °F) (Temporal)   Ht 1.905 m (6' 3\")   Wt 84.4 kg (186 lb)   SpO2 99%   BMI 23.25 kg/m²     Physical Exam  Constitutional:       Appearance: Normal appearance.   HENT:      Head: Normocephalic and atraumatic.      Nose: Nose normal.      Mouth/Throat:      Mouth: Mucous membranes are moist.   Eyes:      Extraocular Movements: Extraocular movements intact.   Cardiovascular:      Rate and Rhythm: Normal rate and regular rhythm.   Pulmonary:      Effort: No respiratory distress.      Breath sounds: No wheezing.   Abdominal:      General: Bowel sounds are normal.      Palpations: Abdomen is soft.   Neurological:      General: No focal deficit present.       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  Orders:    Comprehensive Metabolic Panel; Future    Lipid Panel Non-Fasting; Future    Prostate cancer screening    Orders:    Prostate Specific Antigen; Future

## 2025-07-03 ENCOUNTER — APPOINTMENT (OUTPATIENT)
Dept: ORTHOPEDIC SURGERY | Facility: CLINIC | Age: 50
End: 2025-07-03
Payer: COMMERCIAL